# Patient Record
Sex: MALE | Race: WHITE | NOT HISPANIC OR LATINO | Employment: FULL TIME | URBAN - METROPOLITAN AREA
[De-identification: names, ages, dates, MRNs, and addresses within clinical notes are randomized per-mention and may not be internally consistent; named-entity substitution may affect disease eponyms.]

---

## 2018-07-16 ENCOUNTER — TRANSFERRED RECORDS (OUTPATIENT)
Dept: HEALTH INFORMATION MANAGEMENT | Facility: CLINIC | Age: 33
End: 2018-07-16

## 2020-01-07 ENCOUNTER — OFFICE VISIT (OUTPATIENT)
Dept: FAMILY MEDICINE | Facility: CLINIC | Age: 35
End: 2020-01-07
Payer: COMMERCIAL

## 2020-01-07 VITALS
TEMPERATURE: 97.9 F | WEIGHT: 178.1 LBS | SYSTOLIC BLOOD PRESSURE: 105 MMHG | DIASTOLIC BLOOD PRESSURE: 67 MMHG | HEIGHT: 69 IN | HEART RATE: 58 BPM | OXYGEN SATURATION: 95 % | BODY MASS INDEX: 26.38 KG/M2 | RESPIRATION RATE: 18 BRPM

## 2020-01-07 DIAGNOSIS — K29.00 ACUTE GASTRITIS, PRESENCE OF BLEEDING UNSPECIFIED, UNSPECIFIED GASTRITIS TYPE: Primary | ICD-10-CM

## 2020-01-07 PROBLEM — J30.9 ALLERGIC RHINITIS: Status: ACTIVE | Noted: 2020-01-07

## 2020-01-07 PROBLEM — H90.12 CONDUCTIVE HEARING LOSS OF LEFT EAR: Status: ACTIVE | Noted: 2020-01-07

## 2020-01-07 LAB
ERYTHROCYTE [DISTWIDTH] IN BLOOD BY AUTOMATED COUNT: 12.8 % (ref 10–15)
HCT VFR BLD AUTO: 45.7 % (ref 40–53)
HGB BLD-MCNC: 15.7 G/DL (ref 13.3–17.7)
LIPASE SERPL-CCNC: 73 U/L (ref 73–393)
MCH RBC QN AUTO: 31.8 PG (ref 26.5–33)
MCHC RBC AUTO-ENTMCNC: 34.4 G/DL (ref 31.5–36.5)
MCV RBC AUTO: 93 FL (ref 78–100)
PLATELET # BLD AUTO: 225 10E9/L (ref 150–450)
RBC # BLD AUTO: 4.93 10E12/L (ref 4.4–5.9)
WBC # BLD AUTO: 5.5 10E9/L (ref 4–11)

## 2020-01-07 PROCEDURE — 83690 ASSAY OF LIPASE: CPT | Performed by: FAMILY MEDICINE

## 2020-01-07 PROCEDURE — 85027 COMPLETE CBC AUTOMATED: CPT | Performed by: FAMILY MEDICINE

## 2020-01-07 PROCEDURE — 36415 COLL VENOUS BLD VENIPUNCTURE: CPT | Performed by: FAMILY MEDICINE

## 2020-01-07 PROCEDURE — 99203 OFFICE O/P NEW LOW 30 MIN: CPT | Performed by: FAMILY MEDICINE

## 2020-01-07 SDOH — HEALTH STABILITY: MENTAL HEALTH: HOW OFTEN DO YOU HAVE A DRINK CONTAINING ALCOHOL?: MONTHLY OR LESS

## 2020-01-07 ASSESSMENT — MIFFLIN-ST. JEOR: SCORE: 1728.24

## 2020-01-07 NOTE — PROGRESS NOTES
Subjective     Yahir Phillips is a 34 year old male who presents to clinic today for the following health issues:    HPI   Gastrointestinal symptoms      Duration: November 2019    Description:           ABDOMINAL PAIN - middle below the sternum  .   Pain is described as aching and empty ness even on a full stomache .    Intensity:  mild    Accompanying signs and symptoms:  none    History  Previous {similar problem: no   Previous evaluation:  none    Aggravating factors: meals, caffeine and or sometimes it randomly when be achy    Alleviating factors: nothing    Other Therapies tried: none    For 1-2 days around Thanksgiving - acid burning feeling in stomach  Recently started to have again -   Locations is epigastric   Notices 3-4 times a day  Trigger - notices more after eating  Coffee may also be a trigger  No nausea or vomiting  BM - regular and unchanged - soft stools  Unaware of melena or hematochezia    Will use NSAIDs - once or twice a month - alternates advil or aleve  ETOH - drinks social - cut back to about 3 times per month   no family hx of ulcer    Ten years ago had acid reflux - improved with weight loss - this feels different      -------------------------------------    Patient Active Problem List   Diagnosis     Allergic rhinitis     Erythrasma     Family history of colonic polyps     Hyperlipidemia     Conductive hearing loss of left ear     Past Surgical History:   Procedure Laterality Date     FRACTURE TX, WRIST RT/LT       HC TOOTH EXTRACTION W/FORCEP         Social History     Tobacco Use     Smoking status: Never Smoker     Smokeless tobacco: Never Used   Substance Use Topics     Alcohol use: Yes     Frequency: Monthly or less     Family History   Problem Relation Age of Onset     Heart Disease Father      Hypertension Father      Colon Polyps Mother            Reviewed and updated as needed this visit by Provider  Tobacco  Meds  Problems  Med Hx  Surg Hx  Fam Hx  Soc Hx        Review of  "Systems   ROS COMP: Constitutional, HEENT, cardiovascular, pulmonary, GI, , musculoskeletal, neuro, skin, endocrine and psych systems are negative, except as otherwise noted.      Objective    /67   Pulse 58   Temp 97.9  F (36.6  C) (Oral)   Resp 18   Ht 1.753 m (5' 9\")   Wt 80.8 kg (178 lb 1.6 oz)   SpO2 95%   BMI 26.30 kg/m    Body mass index is 26.3 kg/m .  Physical Exam   GENERAL: healthy, alert and no distress  RESP: lungs clear to auscultation - no rales, rhonchi or wheezes  CV: regular rate and rhythm, normal S1 S2, no S3 or S4, no murmur, click or rub, no peripheral edema and peripheral pulses strong  ABDOMEN: bowel sounds normal and soft with some epigastric tenderness but no g/r/r    Diagnostic Test Results:  Labs reviewed in Epic  Results for orders placed or performed in visit on 01/07/20 (from the past 24 hour(s))   CBC with platelets   Result Value Ref Range    WBC 5.5 4.0 - 11.0 10e9/L    RBC Count 4.93 4.4 - 5.9 10e12/L    Hemoglobin 15.7 13.3 - 17.7 g/dL    Hematocrit 45.7 40.0 - 53.0 %    MCV 93 78 - 100 fl    MCH 31.8 26.5 - 33.0 pg    MCHC 34.4 31.5 - 36.5 g/dL    RDW 12.8 10.0 - 15.0 %    Platelet Count 225 150 - 450 10e9/L           Assessment & Plan     1. Acute gastritis, presence of bleeding unspecified, unspecified gastritis type  Suspect gastritis vs gastric ulcer based on his symptoms  Has not tried anything yet so will first try omeprazole 20mg daily for next 2-4 weeks  Discussed checking some labs and will send home with stool to look for H. Pylori  F/u 4-6 weeks  - CBC with platelets  - Lipase  - Helicobacter pylori Antigen Stool; Future  - omeprazole (PRILOSEC) 20 MG DR capsule; Take 1 capsule (20 mg) by mouth daily  Dispense: 30 capsule; Refill: 1           Pt will call or RTC if symptoms worsen or do not improve.      Return in about 4 weeks (around 2/4/2020) for epigastric pain.    Analisa Wilkins,   [facility]    "

## 2020-01-08 NOTE — RESULT ENCOUNTER NOTE
Please send copy of results with a letter:    Enclosed is a copy of your results. If you have any questions, please contact us at [phone].      -Normal red blood cell (hgb) levels, normal white blood cell count and normal platelet levels.  -Lipase or pancreas test is all normal.    Thanks,   Analisa Wilkins, DO

## 2020-01-14 DIAGNOSIS — K29.00 ACUTE GASTRITIS, PRESENCE OF BLEEDING UNSPECIFIED, UNSPECIFIED GASTRITIS TYPE: ICD-10-CM

## 2020-01-14 PROCEDURE — 87338 HPYLORI STOOL AG IA: CPT | Performed by: FAMILY MEDICINE

## 2020-01-15 ENCOUNTER — TELEPHONE (OUTPATIENT)
Dept: FAMILY MEDICINE | Facility: CLINIC | Age: 35
End: 2020-01-15

## 2020-01-15 DIAGNOSIS — K29.00 ACUTE GASTRITIS, PRESENCE OF BLEEDING UNSPECIFIED, UNSPECIFIED GASTRITIS TYPE: ICD-10-CM

## 2020-01-15 DIAGNOSIS — B96.81 GASTRIC ULCER DUE TO HELICOBACTER PYLORI, ACUTE: Primary | ICD-10-CM

## 2020-01-15 DIAGNOSIS — K25.3 GASTRIC ULCER DUE TO HELICOBACTER PYLORI, ACUTE: Primary | ICD-10-CM

## 2020-01-15 LAB — H PYLORI AG STL QL IA: POSITIVE

## 2020-01-15 RX ORDER — CLARITHROMYCIN 500 MG
500 TABLET ORAL 2 TIMES DAILY
Qty: 28 TABLET | Refills: 0 | Status: SHIPPED | OUTPATIENT
Start: 2020-01-15 | End: 2020-01-29

## 2020-01-15 RX ORDER — AMOXICILLIN 500 MG/1
1000 CAPSULE ORAL 2 TIMES DAILY
Qty: 56 CAPSULE | Refills: 0 | Status: SHIPPED | OUTPATIENT
Start: 2020-01-15 | End: 2020-01-29

## 2020-01-15 NOTE — RESULT ENCOUNTER NOTE
Dear Yahir,   Your test results are all back -   I made a slight change to your medications so read below carefully.  I faxed a prescription to your pharmacy for :  Clarithromycin 500mg take one tablet twice a day for 14 days  Amoxicillin 500mg - take TWO TABLETS twice a day for 14 days  Omeprazole 20mg - take one tablet twice a day for 14 days  (stop the omeprazole you were taking because it is in the three meds above)  Let us know if you have any questions.  -Analisa Wilkins, DO

## 2020-01-15 NOTE — TELEPHONE ENCOUNTER
Called patient to discuss -   Will treat with triple therapy  F/u 2-3 months to discuss recheck  PN

## 2020-03-11 ENCOUNTER — HEALTH MAINTENANCE LETTER (OUTPATIENT)
Age: 35
End: 2020-03-11

## 2020-04-07 ENCOUNTER — VIRTUAL VISIT (OUTPATIENT)
Dept: FAMILY MEDICINE | Facility: OTHER | Age: 35
End: 2020-04-07

## 2020-04-08 NOTE — PROGRESS NOTES
"Date: 2020 18:54:24  Clinician: Romana Casillas  Clinician NPI: [id number]  Patient: Yahir Phillips  Patient : 1985  Patient Address: [address] [place] [address]  Patient Phone: [phone]  Visit Protocol: URI  Patient Summary:  Yahir is a 34 year old ( : 1985 ) male who initiated a Visit for COVID-19 (Coronavirus) evaluation and screening. When asked the question \"Please sign me up to receive news, health information and promotions from [employer].\", Yahir responded \"No\".    Yahir states his symptoms started today.   His symptoms consist of myalgia, nasal congestion, malaise, chills, diarrhea, vomiting, and nausea. Yahir also feels feverish.   Symptom details     Nasal secretions: The color of his mucus is clear.    Temperature: His current temperature is 99.2 degrees Fahrenheit.      Yahir denies having rhinitis, facial pain or pressure, sore throat, cough, ear pain, teeth pain, headache, and wheezing. He also denies taking antibiotic medication for the symptoms and having recent facial or sinus surgery in the past 60 days. He is not experiencing dyspnea.   Precipitating events  He has not recently been exposed to someone with influenza. Yahir has not been in close contact with any high risk individuals.   Pertinent COVID-19 (Coronavirus) information  Yahir has not traveled internationally or to the areas where COVID-19 (Coronavirus) is widespread, including cruise ship travel in the last 14 days before the start of his symptoms.   Yahir has not had a close contact with a laboratory-confirmed COVID-19 patient within 14 days of symptom onset. He also has not had a close contact with a suspected COVID-19 patient within 14 days of symptom onset.   Yahir does not work or volunteer as healthcare worker or a  and does not work or volunteer in a healthcare facility. He does not live with a healthcare worker.   Pertinent medical history  Yahir does not need a return " to work/school note.   Weight: 175 lbs   Yahir does not smoke or use smokeless tobacco.   Additional information as reported by the patient (free text): Last night, about 1 hour after eating dinner, I started to feel nauseous.  I started getting cold sweats.  I ended up vomiting and having diarrhea.  I did not have a fever last night.  Throughout the night I had chills, aches and sweats, and vomited and had diarrhea again around 3AM.  This morning work up with   Weight: 175 lbs    MEDICATIONS: No current medications, ALLERGIES: NKDA  Clinician Response:  Dear Yahir,   You are likely having a viral gastroenteritis as manifested by diarrhea, nausea and vomiting. And myalgia  I advise you to hydrate and rest.  Take an electrolyte filled fluid like Gatorade  Follow up at Urgent care or ER if getting worse       Diagnosis: Diarrhea, unspecified  Diagnosis ICD: R19.7

## 2020-07-07 ENCOUNTER — MYC MEDICAL ADVICE (OUTPATIENT)
Dept: FAMILY MEDICINE | Facility: CLINIC | Age: 35
End: 2020-07-07

## 2020-07-07 DIAGNOSIS — H91.92 HEARING LOSS OF LEFT EAR, UNSPECIFIED HEARING LOSS TYPE: ICD-10-CM

## 2020-07-07 DIAGNOSIS — H90.2 CONDUCTIVE HEARING LOSS: Primary | ICD-10-CM

## 2020-07-07 NOTE — TELEPHONE ENCOUNTER
PN  See [employer] message  Last saw you 1/7/20 for acute gastritis.  Started order  Please advise  Thank you,  Tyesha Ashley RN

## 2020-07-09 ENCOUNTER — TELEPHONE (OUTPATIENT)
Dept: SLEEP MEDICINE | Facility: CLINIC | Age: 35
End: 2020-07-09

## 2020-07-09 NOTE — TELEPHONE ENCOUNTER
Patient left message asking is sleep consultation is needed before a sleep study. The sleep center process is that patients have a consultation prior to sleep study. Patient to call if has further questions. This message was relayed to me by Komal Osman.

## 2020-07-14 ENCOUNTER — TELEPHONE (OUTPATIENT)
Dept: AUDIOLOGY | Facility: CLINIC | Age: 35
End: 2020-07-14

## 2020-07-14 NOTE — TELEPHONE ENCOUNTER
Called patient to schedule per Sherri Khan AuD:    1.  Audiology WIN  2. Consult with Dr. Mendes or Dr. Nissen following completion of hearing test:    Appointment Type - VIDEO VISIT NEW / TELEPHONE VISIT NEW  Provider - Dr. Mendes or Dr. Nissen  Appointment Notes - Asymmetric Hearing Loss, WIN prior, previously seen at [facility] w/ scheduling instructions and ENT call center number. Patient is welcome to schedule video or telephone visit with Dr. Nissen or Dr Mendes after completion of  Audiology WIN.

## 2020-09-30 ENCOUNTER — E-VISIT (OUTPATIENT)
Dept: FAMILY MEDICINE | Facility: CLINIC | Age: 35
End: 2020-09-30
Payer: COMMERCIAL

## 2020-09-30 DIAGNOSIS — R10.13 EPIGASTRIC PAIN: ICD-10-CM

## 2020-09-30 DIAGNOSIS — R10.13 ABDOMINAL PAIN, EPIGASTRIC: Primary | ICD-10-CM

## 2020-09-30 PROCEDURE — 99422 OL DIG E/M SVC 11-20 MIN: CPT | Performed by: FAMILY MEDICINE

## 2020-10-02 DIAGNOSIS — R10.13 ABDOMINAL PAIN, EPIGASTRIC: ICD-10-CM

## 2020-10-02 LAB
ALBUMIN SERPL-MCNC: 4.1 G/DL (ref 3.4–5)
ALP SERPL-CCNC: 87 U/L (ref 40–150)
ALT SERPL W P-5'-P-CCNC: 41 U/L (ref 0–70)
ANION GAP SERPL CALCULATED.3IONS-SCNC: 8 MMOL/L (ref 3–14)
AST SERPL W P-5'-P-CCNC: 34 U/L (ref 0–45)
BILIRUB SERPL-MCNC: 0.6 MG/DL (ref 0.2–1.3)
BUN SERPL-MCNC: 11 MG/DL (ref 7–30)
CALCIUM SERPL-MCNC: 9.3 MG/DL (ref 8.5–10.1)
CHLORIDE SERPL-SCNC: 105 MMOL/L (ref 94–109)
CO2 SERPL-SCNC: 24 MMOL/L (ref 20–32)
CREAT SERPL-MCNC: 0.81 MG/DL (ref 0.66–1.25)
ERYTHROCYTE [DISTWIDTH] IN BLOOD BY AUTOMATED COUNT: 12.5 % (ref 10–15)
GFR SERPL CREATININE-BSD FRML MDRD: >90 ML/MIN/{1.73_M2}
GLUCOSE SERPL-MCNC: 90 MG/DL (ref 70–99)
HCT VFR BLD AUTO: 43 % (ref 40–53)
HGB BLD-MCNC: 14.9 G/DL (ref 13.3–17.7)
MCH RBC QN AUTO: 31.7 PG (ref 26.5–33)
MCHC RBC AUTO-ENTMCNC: 34.7 G/DL (ref 31.5–36.5)
MCV RBC AUTO: 92 FL (ref 78–100)
PLATELET # BLD AUTO: 245 10E9/L (ref 150–450)
POTASSIUM SERPL-SCNC: 3.8 MMOL/L (ref 3.4–5.3)
PROT SERPL-MCNC: 7.8 G/DL (ref 6.8–8.8)
RBC # BLD AUTO: 4.7 10E12/L (ref 4.4–5.9)
SODIUM SERPL-SCNC: 137 MMOL/L (ref 133–144)
WBC # BLD AUTO: 4.3 10E9/L (ref 4–11)

## 2020-10-02 PROCEDURE — 85027 COMPLETE CBC AUTOMATED: CPT | Performed by: FAMILY MEDICINE

## 2020-10-02 PROCEDURE — 80053 COMPREHEN METABOLIC PANEL: CPT | Performed by: FAMILY MEDICINE

## 2020-10-02 PROCEDURE — 36415 COLL VENOUS BLD VENIPUNCTURE: CPT | Performed by: FAMILY MEDICINE

## 2020-10-02 RX ORDER — OMEPRAZOLE 40 MG/1
40 CAPSULE, DELAYED RELEASE ORAL 2 TIMES DAILY
Qty: 28 CAPSULE | Refills: 0 | Status: SHIPPED | OUTPATIENT
Start: 2020-10-02 | End: 2020-10-16

## 2020-10-06 ENCOUNTER — DOCUMENTATION ONLY (OUTPATIENT)
Dept: FAMILY MEDICINE | Facility: CLINIC | Age: 35
End: 2020-10-06

## 2020-10-06 DIAGNOSIS — R10.13 ABDOMINAL PAIN, EPIGASTRIC: ICD-10-CM

## 2020-10-06 DIAGNOSIS — R19.5 CHANGE IN STOOL: Primary | ICD-10-CM

## 2020-10-06 LAB — HEMOCCULT STL QL IA: NEGATIVE

## 2020-10-06 PROCEDURE — 82274 ASSAY TEST FOR BLOOD FECAL: CPT | Performed by: FAMILY MEDICINE

## 2020-10-06 PROCEDURE — 87338 HPYLORI STOOL AG IA: CPT | Performed by: FAMILY MEDICINE

## 2020-10-06 NOTE — RESULT ENCOUNTER NOTE
Dear Yahir,   Your test results are all back -   -All of your labs are normal.  Let us know if you have any questions.  -Analisa Wilkins, DO

## 2020-10-07 LAB — H PYLORI AG STL QL IA: NEGATIVE

## 2020-10-08 ENCOUNTER — APPOINTMENT (OUTPATIENT)
Dept: LAB | Facility: CLINIC | Age: 35
End: 2020-10-08
Payer: COMMERCIAL

## 2020-10-09 PROCEDURE — 87209 SMEAR COMPLEX STAIN: CPT | Performed by: FAMILY MEDICINE

## 2020-10-09 PROCEDURE — 87177 OVA AND PARASITES SMEARS: CPT | Performed by: FAMILY MEDICINE

## 2020-10-10 PROCEDURE — 87177 OVA AND PARASITES SMEARS: CPT | Performed by: FAMILY MEDICINE

## 2020-10-10 PROCEDURE — 87209 SMEAR COMPLEX STAIN: CPT | Performed by: FAMILY MEDICINE

## 2020-10-12 DIAGNOSIS — R19.5 CHANGE IN STOOL: ICD-10-CM

## 2020-10-13 LAB
O+P STL MICRO: NORMAL
SPECIMEN SOURCE: NORMAL
SPECIMEN SOURCE: NORMAL

## 2020-10-13 NOTE — RESULT ENCOUNTER NOTE
Dear Yahir,   Your test results are all back -   -All of your labs are normal.  I wonder if we should move on to the endoscopy or if you prefer have you see a GI doctor?  Let us know if you have any questions.  -Analisa Wilkins, DO

## 2021-01-04 ENCOUNTER — HEALTH MAINTENANCE LETTER (OUTPATIENT)
Age: 36
End: 2021-01-04

## 2021-02-18 ENCOUNTER — TRANSFERRED RECORDS (OUTPATIENT)
Dept: HEALTH INFORMATION MANAGEMENT | Facility: CLINIC | Age: 36
End: 2021-02-18

## 2021-02-18 LAB
ALT SERPL-CCNC: 36 IU/L (ref 0–44)
AST SERPL-CCNC: 31 IU/L (ref 0–40)
CREAT SERPL-MCNC: 0.78 MG/DL (ref 0.76–1.27)
GFR SERPL CREATININE-BSD FRML MDRD: 115 ML/MIN/1.73
GLUCOSE SERPL-MCNC: 85 MG/DL (ref 65–99)
POTASSIUM SERPL-SCNC: 4.8 MMOL/L (ref 3.5–5.2)

## 2021-03-10 ENCOUNTER — TRANSFERRED RECORDS (OUTPATIENT)
Dept: HEALTH INFORMATION MANAGEMENT | Facility: CLINIC | Age: 36
End: 2021-03-10

## 2021-03-25 ENCOUNTER — TRANSFERRED RECORDS (OUTPATIENT)
Dept: HEALTH INFORMATION MANAGEMENT | Facility: CLINIC | Age: 36
End: 2021-03-25

## 2021-04-02 NOTE — PROGRESS NOTES
SUBJECTIVE:   CC: Yahir Phillips is an 35 year old male who presents for preventative health visit.       Patient has been advised of split billing requirements and indicates understanding: Yes  Healthy Habits:     Getting at least 3 servings of Calcium per day:  Yes    Bi-annual eye exam:  Yes    Dental care twice a year:  Yes    Sleep apnea or symptoms of sleep apnea:  Daytime drowsiness and Excessive snoring    Diet:  Regular (no restrictions)    Frequency of exercise:  2-3 days/week    Duration of exercise:  30-45 minutes    Taking medications regularly:  Not Applicable    Medication side effects:  Not applicable    PHQ-2 Total Score: 0    Additional concerns today:  Yes        Today's PHQ-2 Score:   PHQ-2 ( 1999 Pfizer) 3/31/2021   Q1: Little interest or pleasure in doing things 0   Q2: Feeling down, depressed or hopeless 0   PHQ-2 Score 0   Q1: Little interest or pleasure in doing things Not at all   Q2: Feeling down, depressed or hopeless Not at all   PHQ-2 Score 0       Abuse: Current or Past(Physical, Sexual or Emotional)- No  Do you feel safe in your environment? Yes    Have you ever done Advance Care Planning? (For example, a Health Directive, POLST, or a discussion with a medical provider or your loved ones about your wishes): No, advance care planning information given to patient to review.  Patient plans to discuss their wishes with loved ones or provider.      Social History     Tobacco Use     Smoking status: Never Smoker     Smokeless tobacco: Never Used   Substance Use Topics     Alcohol use: Yes     Frequency: Monthly or less         No flowsheet data found.    Reviewed and updated as needed this visit by clinical staff  Tobacco  Allergies  Meds              Reviewed and updated as needed this visit by Provider                  New patient today.  Specialists: GI    Social: nonsmoker  Significant FHx: CAD-father, has stent  Exercise/Diet: as per above    Colonoscopy: month ago, also had EGD;  "seeing GI for stomach issues    Tdap: UTD    COVID ab requested. Possible symptoms a year ago.   Interested in few referrals today: audiology for hearing loss, cardiology for family hx of heart disease and optimization, sleep specialist for snoring/sleep apnea evaluation, derm for routine skin check        Review of Systems  10 point ROS of systems including Constitutional, Eyes, Respiratory, Cardiovascular, Gastroenterology, Genitourinary, Integumentary, Muscularskeletal, Psychiatric were all negative except for pertinent positives noted in my HPI.      OBJECTIVE:   /65 (BP Location: Left arm, Patient Position: Sitting)   Pulse 57   Temp 96.9  F (36.1  C) (Temporal)   Ht 1.753 m (5' 9.02\")   Wt 80.7 kg (178 lb)   SpO2 98%   BMI 26.27 kg/m      Physical Exam    GENERAL APPEARANCE: AAOx3, no distress. Well developed.    RESP: Lungs CTA bilaterally. No w/r/r. No distress     CV: RRR, S1/S2 present. No m/r/c.     ABDOMEN:  soft, nontender, no distention. No rebound or guarding.     EXT: No c/c/e in lower extremities b/l. No rashes or deformities noted.    MSK: ROM and strength intact in four extremities. No gross deformities noted.    SKIN: no suspicious lesions or rashes    NEURO: AAOx3, Motor function intact w/ 5/5 strength bilaterally to UE and LE.  Speech is fluent and comprehension intact. No gait abnormalities noted.    PSYCH: appropriate mood and affect.       ASSESSMENT/PLAN:   Yahir was seen today for physical.    Diagnoses and all orders for this visit:    Routine history and physical examination of adult    Family history of colonic polyps   UTD w/ cscope per GI work up recently. Also had EGD.    Encounter for screening for COVID-19  Per pnt request. He understands test limitations and is still planning for vaccine in near future. Asymptomatic   -     COVID-19 Virus (Coronavirus) Antibody & Titer Reflex; Future    Hearing loss, unspecified hearing loss type, unspecified laterality  -     " "AUDIOLOGY ADULT REFERRAL; Future    Family history of ischemic heart disease  Per pnt request, optimization given his family hx which concerns him. Cardio referral and check fasting lipids.  -     CARDIOLOGY EVAL ADULT REFERRAL; Future  -     Lipid panel reflex to direct LDL Fasting    Snoring  -     SLEEP EVALUATION & MANAGEMENT REFERRAL - ADULT -[facility] [place] [phone]  (Age 18 and up); Future    Skin lesion  Routine skin check  -     DERMATOLOGY ADULT REFERRAL; Future    COUNSELING:   Reviewed preventive health counseling, as reflected in patient instructions       Regular exercise       Healthy diet/nutrition    Estimated body mass index is 26.27 kg/m  as calculated from the following:    Height as of this encounter: 1.753 m (5' 9.02\").    Weight as of this encounter: 80.7 kg (178 lb).       He reports that he has never smoked. He has never used smokeless tobacco.      Counseling Resources:  ATP IV Guidelines  Pooled Cohorts Equation Calculator  FRAX Risk Assessment  ICSI Preventive Guidelines  Dietary Guidelines for Americans, 2010  USDA's MyPlate  ASA Prophylaxis  Lung CA Screening    DO ANDREW Self [facility]  "

## 2021-04-04 ENCOUNTER — TRANSFERRED RECORDS (OUTPATIENT)
Dept: HEALTH INFORMATION MANAGEMENT | Facility: CLINIC | Age: 36
End: 2021-04-04

## 2021-04-05 ENCOUNTER — OFFICE VISIT (OUTPATIENT)
Dept: FAMILY MEDICINE | Facility: CLINIC | Age: 36
End: 2021-04-05
Payer: COMMERCIAL

## 2021-04-05 VITALS
BODY MASS INDEX: 26.36 KG/M2 | DIASTOLIC BLOOD PRESSURE: 65 MMHG | WEIGHT: 178 LBS | HEIGHT: 69 IN | SYSTOLIC BLOOD PRESSURE: 101 MMHG | OXYGEN SATURATION: 98 % | HEART RATE: 57 BPM | TEMPERATURE: 96.9 F

## 2021-04-05 DIAGNOSIS — H91.90 HEARING LOSS, UNSPECIFIED HEARING LOSS TYPE, UNSPECIFIED LATERALITY: ICD-10-CM

## 2021-04-05 DIAGNOSIS — Z83.719 FAMILY HISTORY OF COLONIC POLYPS: ICD-10-CM

## 2021-04-05 DIAGNOSIS — Z82.49 FAMILY HISTORY OF ISCHEMIC HEART DISEASE: ICD-10-CM

## 2021-04-05 DIAGNOSIS — Z00.00 ROUTINE HISTORY AND PHYSICAL EXAMINATION OF ADULT: Primary | ICD-10-CM

## 2021-04-05 DIAGNOSIS — L98.9 SKIN LESION: ICD-10-CM

## 2021-04-05 DIAGNOSIS — R06.83 SNORING: ICD-10-CM

## 2021-04-05 DIAGNOSIS — Z11.52 ENCOUNTER FOR SCREENING FOR COVID-19: ICD-10-CM

## 2021-04-05 LAB
CHOLEST SERPL-MCNC: 245 MG/DL
HDLC SERPL-MCNC: 48 MG/DL
LDLC SERPL CALC-MCNC: 167 MG/DL
NONHDLC SERPL-MCNC: 197 MG/DL
TRIGL SERPL-MCNC: 151 MG/DL

## 2021-04-05 PROCEDURE — 99213 OFFICE O/P EST LOW 20 MIN: CPT | Mod: 25 | Performed by: INTERNAL MEDICINE

## 2021-04-05 PROCEDURE — 80061 LIPID PANEL: CPT | Performed by: INTERNAL MEDICINE

## 2021-04-05 PROCEDURE — 86769 SARS-COV-2 COVID-19 ANTIBODY: CPT | Performed by: INTERNAL MEDICINE

## 2021-04-05 PROCEDURE — 36415 COLL VENOUS BLD VENIPUNCTURE: CPT | Performed by: INTERNAL MEDICINE

## 2021-04-05 PROCEDURE — 99395 PREV VISIT EST AGE 18-39: CPT | Performed by: INTERNAL MEDICINE

## 2021-04-05 ASSESSMENT — MIFFLIN-ST. JEOR: SCORE: 1723.03

## 2021-04-06 LAB
SARS-COV-2 AB PNL SERPL IA: NEGATIVE
SARS-COV-2 IGG SERPL IA-ACNC: NORMAL

## 2021-04-07 ENCOUNTER — MYC MEDICAL ADVICE (OUTPATIENT)
Dept: FAMILY MEDICINE | Facility: CLINIC | Age: 36
End: 2021-04-07

## 2021-04-08 ENCOUNTER — TRANSFERRED RECORDS (OUTPATIENT)
Dept: HEALTH INFORMATION MANAGEMENT | Facility: CLINIC | Age: 36
End: 2021-04-08

## 2021-04-09 ENCOUNTER — TRANSFERRED RECORDS (OUTPATIENT)
Dept: HEALTH INFORMATION MANAGEMENT | Facility: CLINIC | Age: 36
End: 2021-04-09

## 2021-04-09 NOTE — TELEPHONE ENCOUNTER
Amy,   See below - does this need to go through you first for insurance referral, or can we just fax over the order?     Thank you  Anya MASON RN

## 2021-04-09 NOTE — TELEPHONE ENCOUNTER
Pt does have open access insurance. You can fax the referral to [facility].    Yuliet-Referral Coordinator

## 2021-04-16 DIAGNOSIS — Z82.49 FAMILY HISTORY OF ISCHEMIC HEART DISEASE: Primary | ICD-10-CM

## 2021-05-03 NOTE — PROGRESS NOTES
[facility] - PRIMARY CARE SKIN    CC: skin cancer screening (full-body)  SUBJECTIVE:   Yahir Phillips is a(n) 35 year old male who presents to clinic today for a full-body skin exam.      Issue One: no particular skin issues      Personal Medical History  Skin cancer: NO  Eczema Psoriasis Lupus   NO NO NO     Family Medical History  Skin cancer: NO  Eczema Psoriasis Lupus   NO NO NO     Sun Protection : SPF 30-50    Occupation: indoor ().  Refer to electronic medical record (EMR) for past medical history and medications.    ROS: 14 point review of systems was negative except the symptoms listed above in the HPI.    OBJECTIVE:   GENERAL: healthy, alert and no distress.  HEENT: PERRL. Conjunctiva, sclera clear.  SKIN: Corey Skin Type - I.  This patient was examined from the top of the head to the bottom of the feet  including scalp, face, neck, trunk, buttocks, both arms, both legs, both hands, both feet, and all fingers and toes. The dermatoscope was used to help evaluate pigmented lesions.  Skin Pertinent Findings:  Face: brown macules    Trunk, arms, legs,  :           Brown, macule(s) most consistent with benign solar lentigo          Raised, coarse textured, stuck appearing lesion consistent with seborrheic keratosis .-back          Brown macules of various sizes and shapes most consistent with (benign) melanocytic nevi       Right lateral chest wall- 4 mm irregular pigmented macule ? Atypical ? other      Left posterior shoulder :    4 mm X 2 mm erythematous smooth lesion ? Scar ? Other       Left abdomen- 4 mm brown macule with irregular pigmentation ? Atypical ? other              ASSESSMENT:     Encounter Diagnoses   Name Primary?     Skin lesion      Seborrheic keratosis Yes     Ephelides      Melanocytic nevi of trunk          PLAN:   Patient Instructions   Schedule for shave removal of three lesions    Skin exam once per year  SUN PROTECTION INSTRUCTIONS  Sun damage can lead to skin cancer and  "premature aging of the skin.      The best way to protect from sun damage to your skin is to avoid the sun during peak hours (10 am - 2 pm) even on overcast days.    Never use tanning beds. Tanning beds are associated with much higher risks of skin cancer.    All tanning damages the skin. Aim for ivory skin year round and you will have less trouble with your skin in years to come. There is no merit in getting \"a base tan\" before a warm weather vacation, as any tanning indicates your body's response to sun damage.    Stop smoking. Smokers have higher rates of skin cancer and also have premature skin wrinkling.    Use UPF sun-protective clothing, which while more expensive initially provides longer lasting coverage without having to worry about remembering to re-apply.  1. Wear a wide-brimmed hat and sunglasses.   2. Wear sun-protective clothing.  [employer] and other [employer] make sun protective clothing that are stylish, comfortable and cool.   [employer] and other [employer] make UV arm sleeves suitable for golfing, gardening and other activities.    Sunscreen instructions:  1. Use sunscreens with Zinc Oxide, Titanium Dioxide or Avobenzone to protect from UVA rays.  2. Use SPF 30-50+ to protect from UVB rays.  3. Re-apply every 2 hours even if water resistant.  4. Apply on your face every day even when cloudy and even in the winter. UVA \"aging rays\" penetrate window glass and are just as strong in the winter as in the summer.    FYI  You should use about 3 tablespoons of sunscreen to protect your whole body. Thus a typical eight ounce bottle of sunscreen should last 4 applications. Remember, that the SPF rating is compromised if you don t apply enough. Most people only apply 1/2 - 1/3 of the amount they need. Also don t forget areas such as your ears, feet, upper back and harder to reach places. Keep in mind that these amounts should be increased for larger body sizes.    Sunscreens with titanium " dioxide and/or zinc oxide in the active ingredients are physical blockers as opposed to chemical blockers. Chemical-free sunscreens should not irritate the skin.    Spray-on sunscreens may be used for touch-up application only, not as a base layer. Also, use with caution around small children due to inhalation risk.    SPF means sun protection factor, which is just the degree to which the sunscreen can protect against UVB rays. There is no rating system for UVA rays. SPF is calculated as the time skin will burn when sunscreen is applied vs. skin without sunscreen.    Water resistant sunscreens should be re-applied every 1-2 hours.    Product Recommendations:    Consider use of sunscreen sticks with Zinc Oxide and Titanium Dioxide active ingredients such as Neutrogena Pure&Free Baby Sunscreen Stick.    Good examples include: Blue Lizard, EltaMD, Solbar    Good daily moisturizers with SPF: Vanicream, CeraVe.    For sensitive skin, consider : Skin[employer]ica Essential Defense Mineral Shield Broad Spectrum SPF 35    Men: consider use of Neutrogena Triple Protect Facial Lotion    Avoid retinyl palmitate products.  Avoid combination products that include both sunscreen and insect repellant, as sunscreen should be applied every 2 hours, but insect repellant should not be applied as frequently.    For more information:  https://www.skincancer.org/prevention/sun-protection/sunscreen/sunscreens-safe-and-effective        The patient was counseled about sunscreens and sun avoidance. The patient was counseled to check the skin regularly and report any lesion that is new, changing, itching, scabbing, bleeding or otherwise bothersome. The patient was discharged ambulatory and in stable condition.    Educational brochures given to patient: skin cancer.

## 2021-05-04 ENCOUNTER — OFFICE VISIT (OUTPATIENT)
Dept: FAMILY MEDICINE | Facility: CLINIC | Age: 36
End: 2021-05-04
Attending: INTERNAL MEDICINE
Payer: COMMERCIAL

## 2021-05-04 ENCOUNTER — OFFICE VISIT (OUTPATIENT)
Dept: CARDIOLOGY | Facility: CLINIC | Age: 36
End: 2021-05-04
Attending: INTERNAL MEDICINE
Payer: COMMERCIAL

## 2021-05-04 VITALS — DIASTOLIC BLOOD PRESSURE: 68 MMHG | SYSTOLIC BLOOD PRESSURE: 108 MMHG

## 2021-05-04 VITALS
SYSTOLIC BLOOD PRESSURE: 107 MMHG | HEART RATE: 54 BPM | BODY MASS INDEX: 26.66 KG/M2 | DIASTOLIC BLOOD PRESSURE: 62 MMHG | WEIGHT: 180 LBS | HEIGHT: 69 IN | OXYGEN SATURATION: 99 %

## 2021-05-04 DIAGNOSIS — Z83.42 FAMILY HISTORY OF HYPERCHOLESTEROLEMIA: ICD-10-CM

## 2021-05-04 DIAGNOSIS — L81.2 EPHELIDES: ICD-10-CM

## 2021-05-04 DIAGNOSIS — E78.00 HYPERCHOLESTEROLEMIA: Primary | ICD-10-CM

## 2021-05-04 DIAGNOSIS — R07.2 PRECORDIAL PAIN: ICD-10-CM

## 2021-05-04 DIAGNOSIS — R94.31 ABNORMAL ELECTROCARDIOGRAM: ICD-10-CM

## 2021-05-04 DIAGNOSIS — L98.9 SKIN LESION: ICD-10-CM

## 2021-05-04 DIAGNOSIS — L82.1 SEBORRHEIC KERATOSIS: Primary | ICD-10-CM

## 2021-05-04 DIAGNOSIS — Z82.49 FAMILY HISTORY OF PREMATURE CORONARY ARTERY DISEASE: ICD-10-CM

## 2021-05-04 DIAGNOSIS — D22.5 MELANOCYTIC NEVI OF TRUNK: ICD-10-CM

## 2021-05-04 PROCEDURE — 99213 OFFICE O/P EST LOW 20 MIN: CPT | Performed by: FAMILY MEDICINE

## 2021-05-04 PROCEDURE — 99204 OFFICE O/P NEW MOD 45 MIN: CPT | Performed by: INTERNAL MEDICINE

## 2021-05-04 PROCEDURE — 93000 ELECTROCARDIOGRAM COMPLETE: CPT | Performed by: INTERNAL MEDICINE

## 2021-05-04 RX ORDER — ATORVASTATIN CALCIUM 40 MG/1
40 TABLET, FILM COATED ORAL DAILY
Qty: 30 TABLET | Refills: 3 | Status: SHIPPED | OUTPATIENT
Start: 2021-05-04 | End: 2021-05-18

## 2021-05-04 RX ORDER — ASCORBIC ACID 1000 MG
TABLET ORAL
COMMUNITY

## 2021-05-04 SDOH — ECONOMIC STABILITY: INCOME INSECURITY: HOW HARD IS IT FOR YOU TO PAY FOR THE VERY BASICS LIKE FOOD, HOUSING, MEDICAL CARE, AND HEATING?: NOT ASKED

## 2021-05-04 SDOH — ECONOMIC STABILITY: FOOD INSECURITY: WITHIN THE PAST 12 MONTHS, THE FOOD YOU BOUGHT JUST DIDN'T LAST AND YOU DIDN'T HAVE MONEY TO GET MORE.: NOT ASKED

## 2021-05-04 SDOH — HEALTH STABILITY: MENTAL HEALTH: HOW MANY STANDARD DRINKS CONTAINING ALCOHOL DO YOU HAVE ON A TYPICAL DAY?: 1 OR 2

## 2021-05-04 SDOH — ECONOMIC STABILITY: TRANSPORTATION INSECURITY
IN THE PAST 12 MONTHS, HAS THE LACK OF TRANSPORTATION KEPT YOU FROM MEDICAL APPOINTMENTS OR FROM GETTING MEDICATIONS?: NOT ASKED

## 2021-05-04 SDOH — HEALTH STABILITY: MENTAL HEALTH: HOW OFTEN DO YOU HAVE 6 OR MORE DRINKS ON ONE OCCASION?: NEVER

## 2021-05-04 SDOH — ECONOMIC STABILITY: FOOD INSECURITY: WITHIN THE PAST 12 MONTHS, YOU WORRIED THAT YOUR FOOD WOULD RUN OUT BEFORE YOU GOT MONEY TO BUY MORE.: NOT ASKED

## 2021-05-04 SDOH — ECONOMIC STABILITY: TRANSPORTATION INSECURITY
IN THE PAST 12 MONTHS, HAS LACK OF TRANSPORTATION KEPT YOU FROM MEETINGS, WORK, OR FROM GETTING THINGS NEEDED FOR DAILY LIVING?: NOT ASKED

## 2021-05-04 SDOH — HEALTH STABILITY: MENTAL HEALTH: HOW OFTEN DO YOU HAVE A DRINK CONTAINING ALCOHOL?: 2-4 TIMES A MONTH

## 2021-05-04 ASSESSMENT — MIFFLIN-ST. JEOR: SCORE: 1731.85

## 2021-05-04 NOTE — PROGRESS NOTES
Service Date: 05/04/2021    PRIMARY CARE AND REFERRING PROVIDER:  Dr. Dominga Villalobos     REASON FOR VISIT:  Cardiovascular risk stratification in the context of hypercholesterolemia and family history of premature CAD.    HISTORY OF PRESENT ILLNESS:    It was my pleasure to visit with Roberto Phillips who is a pleasant 35-year-old non-obese  gentleman, , employed as an , never-tobacco user.  BMI 26 kg/m2.    Roberto is here because of his concerning family history of premature coronary artery disease.  Both his parents are alive.  His father has a history of hypertension and coronary artery disease with coronary stents placed in his early-to-mid 50s (never smoker).  Mother has hypercholesterolemia and is on treatment.  His paternal grandmother had CABG in her 60s (nonsmoker).  Paternal uncle had myocardial infarctions in his 50s (nonsmoker).  Roberto has 2 siblings and does not know if they have hypercholesterolemia.    Roberto has had elevated cholesterol for as far as he can remember.  His most recent total cholesterol was 245, HDL 48, LDL is high at 167, triglycerides are 151.    I also [facility]  In 2011, his LDL was 143.  In 2018, it was 159.  His HDL has always been lowish at 42 and 48.  He has not been on any lipid-lowering therapy.  He tries to watch his diet.  His weight has not changed appreciably.  He is a meat eater but tries to minimize red meat.  Drinks alcohol socially (2-4 times a month, no binge drinking).  No special diet such as the ketogenic diet.  No tobacco use.  Plays hockey for an hour 3 times a week.    He also describes chest pain that is retrosternal and is most noticeable in bed at night.  Does not radiate, mild in intensity.    DIAGNOSTIC DATA:    ECG done today.  Personally reviewed image.  It is abnormal.  He has sinus bradycardia at 57 BPM which is likely physiologic.  However, he has abnormal T-wave  inversions in inferolateral leads (leads III, aVF, V5-V6).  I compared this to the report of an ECG in Care Everywhere done in 2020 and it stayed sinus bradycardia and abnormal.    Laboratories:  Lipid panel as above.  Creatinine 0.78.  Normal liver enzymes.  Normal CBC.    PHYSICAL EXAMINATION:  Details attached to this note; unremarkable.  VITAL SIGNS:  /62, pulse 54 per minute.  Height 5 feet 9 inches, weight 180 pounds, BMI 26 kg/m2.  HEENT:  Exam is negative for xanthelasma, tendon xanthomas or arcus lipiodes.    VASCULAR EXAMINATION:  Normal.    CARDIAC EXAMINATION:  Normal.    DIAGNOSES:    1.  Precordial chest pain.  2.  Hypercholesterolemia.  3.  Family history of premature CAD.  4.  Family history of hypercholesterolemia.  5.  Abnormal electrocardiogram.    ASSESSMENT:    With his concerning family history and high LDL, Roberto's cardiovascular risk is certainly high but I congratulated him on coming in today because we can take important steps at preventing future heart disease.    PLAN:    1.  Additional fasting lipid laboratories; lipoprotein(a), serum homocysteine, NMR lipid particles, direct LDL, hs-CRP.  2.  After he has completed his fasting lipid labs, he will start atorvastatin 40 mg daily.  If any side effects, he will contact my nurses.    3.  He is understandably concerned with his chest pain and family history.  We should proceed to a CT coronary angiogram.  No shellfish or iodine allergy.  4.  Genetics referral for familial hypercholesterolemia.  5.  Echocardiogram due to abnormal EKG.  6.  Followup video visit in 1 month to discuss results and see how he is doing with statin initiation.    TOTAL TIME:  50 minutes.    Alexandr Mar MD        D: 2021   T: 2021   MT: anisha    Name:     JENY SCHULTZ  MRN:      [id number]        Account:      [id number]   :      1985           Service Date: 2021       Document: [id number]

## 2021-05-04 NOTE — PATIENT INSTRUCTIONS
TESTIN.  Additional fasting lipid labs - lipoprotein a, serum homocysteine, lipid NMR particles, direct LDL, hs CRP.   2.  CT coronary angiogram.   3.  Genetics referral to discuss familial hypercholesterolemia.  4. Heart ultrasound.    MEDICATIONS:   1.  After completing fasting lipid labs, please start  atorvastatin 40 mg daily.    FOLLOW-UP:   1.  Follow-up with Dr. Mar in 1 months - video visit.    If you have any questions or concerns, please contact my nurses at [phone].

## 2021-05-04 NOTE — PROGRESS NOTES
Clinic visit note dictated. Dictation reference number - [id number]          REVIEW OF SYSTEMS:  A comprehensive 10-point review of systems was completed and the pertinent positives are documented in the history of present illness.    Skin:  Negative     Eyes:  Negative    ENT:  Negative    Respiratory:  Negative    Cardiovascular:    Positive for;chest pain  Gastroenterology: Negative    Genitourinary:  Negative    Musculoskeletal:  Negative    Neurologic:  Negative    Psychiatric:  Negative    Heme/Lymph/Imm:  Negative    Endocrine:  Negative      CURRENT MEDICATIONS:  Current Outpatient Medications   Medication Sig Dispense Refill     atorvastatin (LIPITOR) 40 MG tablet Take 1 tablet (40 mg) by mouth daily 30 tablet 3     Valerian Root 500 MG CAPS            ALLERGIES:  No Known Allergies    PAST MEDICAL HISTORY:    Past Medical History:   Diagnosis Date     Pure hypercholesterolemia        PAST SURGICAL HISTORY:    Past Surgical History:   Procedure Laterality Date     FRACTURE TX, WRIST RT/LT       HC TOOTH EXTRACTION W/FORCEP         FAMILY HISTORY:    Family History   Problem Relation Age of Onset     Hypertension Father      Coronary Artery Disease Father 58        Never smoker.     Colon Polyps Mother      Hyperlipidemia Mother      CABG Paternal Grandmother 64     Myocardial Infarction Paternal Uncle 56       SOCIAL HISTORY:    Social History     Socioeconomic History     Marital status:      Spouse name: None     Number of children: 0     Years of education: None     Highest education level: None   Occupational History     Occupation: .   Social Needs     Financial resource strain: None     Food insecurity     Worry: None     Inability: None     Transportation needs     Medical: None     Non-medical: None   Tobacco Use     Smoking status: Never Smoker     Smokeless tobacco: Never Used   Substance and Sexual Activity     Alcohol use: Yes     Frequency: 2-4 times a month     Drinks  "per session: 1 or 2     Binge frequency: Never     Comment: 1 per week     Drug use: Never     Sexual activity: None   Lifestyle     Physical activity     Days per week: None     Minutes per session: None     Stress: None   Relationships     Social connections     Talks on phone: None     Gets together: None     Attends [employer] service: None     Active member of club or organization: None     Attends meetings of clubs or organizations: None     Relationship status: None     Intimate partner violence     Fear of current or ex partner: None     Emotionally abused: None     Physically abused: None     Forced sexual activity: None   Other Topics Concern     Parent/sibling w/ CABG, MI or angioplasty before 65F 55M? Not Asked   Social History Narrative     None       PHYSICAL EXAM:    Vitals: /62   Pulse 54   Ht 1.753 m (5' 9\")   Wt 81.6 kg (180 lb)   SpO2 99%   BMI 26.58 kg/m    Wt Readings from Last 5 Encounters:   05/04/21 81.6 kg (180 lb)   04/05/21 80.7 kg (178 lb)   01/07/20 80.8 kg (178 lb 1.6 oz)       Constitutional: Comfortable at rest. Cooperative, alert and oriented, well developed, well nourished.  Eyes: Pupils equal and round, conjunctivae and lids unremarkable, sclera white, no xanthalasma,   ENT: Satisfactory dentition.  No cyanosis or pallor.  Neck: Carotid pulses are full and equal bilaterally, no carotid bruit, no thyromegaly    Respiratory: Normal respiratory effort with symmetrical chest wall movements and no use of accessory muscles. Good air entry with normal breath sounds and no rales or wheeze.  Cardiovascular: Normal jugular venous pulse and pressure.  Normal carotid pulse character and volume.  No carotid bruit.  Apical impulse is undisplaced and normal to palpation without parasternal heave or thrill.  Heart sounds are normal and regular. No audible murmur. No S3, S4 or friction rub.    GI: Soft, nontender, no hepatosplenomegaly, no masses, active bowel sounds.    Skin: No rash, " erythema, ecchymosis.  Musculoskeletal: Normal muscle tone and strength. Normal gait. No spinal deformities.  Neuropsychiatric: Oriented to time place and person.  Affect normal.  No gross motor deficits.  Extremities: No edema. No clubbing or deformities.        Encounter Diagnoses   Name Primary?     Hypercholesterolemia Yes     Family history of premature coronary artery disease      Family history of hypercholesterolemia      Precordial pain      Abnormal electrocardiogram        Orders Placed This Encounter   Procedures     CTA Angiogram coronary artery     LDL cholesterol direct     LipoFit by NMR     Lipoprotein (a)     Homocysteine     CRP cardiac risk     Follow-Up with Cardiologist     GENETICS REFERRAL     EKG 12-lead complete w/read - Clinics (performed today)     Echocardiogram Complete

## 2021-05-04 NOTE — LETTER
5/4/2021    [facility]  [address]  [facility] [address]    RE: Yahir Phillips       Dear Colleague,    I had the pleasure of seeing Yahir Phillips in the [facility].    Clinic visit note dictated. Dictation reference number - [id number]          REVIEW OF SYSTEMS:  A comprehensive 10-point review of systems was completed and the pertinent positives are documented in the history of present illness.    Skin:  Negative     Eyes:  Negative    ENT:  Negative    Respiratory:  Negative    Cardiovascular:    Positive for;chest pain  Gastroenterology: Negative    Genitourinary:  Negative    Musculoskeletal:  Negative    Neurologic:  Negative    Psychiatric:  Negative    Heme/Lymph/Imm:  Negative    Endocrine:  Negative      CURRENT MEDICATIONS:  Current Outpatient Medications   Medication Sig Dispense Refill     atorvastatin (LIPITOR) 40 MG tablet Take 1 tablet (40 mg) by mouth daily 30 tablet 3     Valerian Root 500 MG CAPS            ALLERGIES:  No Known Allergies    PAST MEDICAL HISTORY:    Past Medical History:   Diagnosis Date     Pure hypercholesterolemia        PAST SURGICAL HISTORY:    Past Surgical History:   Procedure Laterality Date     FRACTURE TX, WRIST RT/LT       HC TOOTH EXTRACTION W/FORCEP         FAMILY HISTORY:    Family History   Problem Relation Age of Onset     Hypertension Father      Coronary Artery Disease Father 58        Never smoker.     Colon Polyps Mother      Hyperlipidemia Mother      CABG Paternal Grandmother 64     Myocardial Infarction Paternal Uncle 56       SOCIAL HISTORY:    Social History     Socioeconomic History     Marital status:      Spouse name: None     Number of children: 0     Years of education: None     Highest education level: None   Occupational History     Occupation: .   Social Needs     Financial resource strain: None     Food insecurity     Worry: None      "Inability: None     Transportation needs     Medical: None     Non-medical: None   Tobacco Use     Smoking status: Never Smoker     Smokeless tobacco: Never Used   Substance and Sexual Activity     Alcohol use: Yes     Frequency: 2-4 times a month     Drinks per session: 1 or 2     Binge frequency: Never     Comment: 1 per week     Drug use: Never     Sexual activity: None   Lifestyle     Physical activity     Days per week: None     Minutes per session: None     Stress: None   Relationships     Social connections     Talks on phone: None     Gets together: None     Attends Yarsani service: None     Active member of club or organization: None     Attends meetings of clubs or organizations: None     Relationship status: None     Intimate partner violence     Fear of current or ex partner: None     Emotionally abused: None     Physically abused: None     Forced sexual activity: None   Other Topics Concern     Parent/sibling w/ CABG, MI or angioplasty before 65F 55M? Not Asked   Social History Narrative     None       PHYSICAL EXAM:    Vitals: /62   Pulse 54   Ht 1.753 m (5' 9\")   Wt 81.6 kg (180 lb)   SpO2 99%   BMI 26.58 kg/m    Wt Readings from Last 5 Encounters:   05/04/21 81.6 kg (180 lb)   04/05/21 80.7 kg (178 lb)   01/07/20 80.8 kg (178 lb 1.6 oz)       Constitutional: Comfortable at rest. Cooperative, alert and oriented, well developed, well nourished.  Eyes: Pupils equal and round, conjunctivae and lids unremarkable, sclera white, no xanthalasma,   ENT: Satisfactory dentition.  No cyanosis or pallor.  Neck: Carotid pulses are full and equal bilaterally, no carotid bruit, no thyromegaly    Respiratory: Normal respiratory effort with symmetrical chest wall movements and no use of accessory muscles. Good air entry with normal breath sounds and no rales or wheeze.  Cardiovascular: Normal jugular venous pulse and pressure.  Normal carotid pulse character and volume.  No carotid bruit.  Apical impulse " is undisplaced and normal to palpation without parasternal heave or thrill.  Heart sounds are normal and regular. No audible murmur. No S3, S4 or friction rub.    GI: Soft, nontender, no hepatosplenomegaly, no masses, active bowel sounds.    Skin: No rash, erythema, ecchymosis.  Musculoskeletal: Normal muscle tone and strength. Normal gait. No spinal deformities.  Neuropsychiatric: Oriented to time place and person.  Affect normal.  No gross motor deficits.  Extremities: No edema. No clubbing or deformities.        Encounter Diagnoses   Name Primary?     Hypercholesterolemia Yes     Family history of premature coronary artery disease      Family history of hypercholesterolemia      Precordial pain      Abnormal electrocardiogram        Orders Placed This Encounter   Procedures     CTA Angiogram coronary artery     LDL cholesterol direct     LipoFit by NMR     Lipoprotein (a)     Homocysteine     CRP cardiac risk     Follow-Up with Cardiologist     GENETICS REFERRAL     EKG 12-lead complete w/read - Clinics (performed today)     Echocardiogram Complete     Thank you for allowing me to participate in the care of your patient.      Sincerely,     Alexandr Mar MD     [facility]    cc:   Dominga Villalobos DO  [address]  [place] [address]

## 2021-05-04 NOTE — PATIENT INSTRUCTIONS
"Schedule for shave removal of three lesions    Skin exam once per year  SUN PROTECTION INSTRUCTIONS  Sun damage can lead to skin cancer and premature aging of the skin.      The best way to protect from sun damage to your skin is to avoid the sun during peak hours (10 am - 2 pm) even on overcast days.    Never use tanning beds. Tanning beds are associated with much higher risks of skin cancer.    All tanning damages the skin. Aim for ivory skin year round and you will have less trouble with your skin in years to come. There is no merit in getting \"a base tan\" before a warm weather vacation, as any tanning indicates your body's response to sun damage.    Stop smoking. Smokers have higher rates of skin cancer and also have premature skin wrinkling.    Use UPF sun-protective clothing, which while more expensive initially provides longer lasting coverage without having to worry about remembering to re-apply.  1. Wear a wide-brimmed hat and sunglasses.   2. Wear sun-protective clothing.  [employer] and other [employer] make sun protective clothing that are stylish, comfortable and cool.   [employer] and other [employer] make UV arm sleeves suitable for golfing, gardening and other activities.    Sunscreen instructions:  1. Use sunscreens with Zinc Oxide, Titanium Dioxide or Avobenzone to protect from UVA rays.  2. Use SPF 30-50+ to protect from UVB rays.  3. Re-apply every 2 hours even if water resistant.  4. Apply on your face every day even when cloudy and even in the winter. UVA \"aging rays\" penetrate window glass and are just as strong in the winter as in the summer.    FYI  You should use about 3 tablespoons of sunscreen to protect your whole body. Thus a typical eight ounce bottle of sunscreen should last 4 applications. Remember, that the SPF rating is compromised if you don t apply enough. Most people only apply 1/2 - 1/3 of the amount they need. Also don t forget areas such as your ears, feet, upper " back and harder to reach places. Keep in mind that these amounts should be increased for larger body sizes.    Sunscreens with titanium dioxide and/or zinc oxide in the active ingredients are physical blockers as opposed to chemical blockers. Chemical-free sunscreens should not irritate the skin.    Spray-on sunscreens may be used for touch-up application only, not as a base layer. Also, use with caution around small children due to inhalation risk.    SPF means sun protection factor, which is just the degree to which the sunscreen can protect against UVB rays. There is no rating system for UVA rays. SPF is calculated as the time skin will burn when sunscreen is applied vs. skin without sunscreen.    Water resistant sunscreens should be re-applied every 1-2 hours.    Product Recommendations:    Consider use of sunscreen sticks with Zinc Oxide and Titanium Dioxide active ingredients such as Neutrogena Pure&Free Baby Sunscreen Stick.    Good examples include: Blue Lizard, EltaMD, Solbar    Good daily moisturizers with SPF: Vanicream, CeraVe.    For sensitive skin, consider : SkinMedica Essential Defense Mineral Shield Broad Spectrum SPF 35    Men: consider use of Neutrogena Triple Protect Facial Lotion    Avoid retinyl palmitate products.  Avoid combination products that include both sunscreen and insect repellant, as sunscreen should be applied every 2 hours, but insect repellant should not be applied as frequently.    For more information:  https://www.skincancer.org/prevention/sun-protection/sunscreen/sunscreens-safe-and-effective

## 2021-05-04 NOTE — LETTER
5/4/2021         RE: Yahir Phillips  [address]  [place] [address]        Dear Colleague,    Thank you for referring your patient, Yahir Phillips, to the [facility]. Please see a copy of my visit note below.    [facility] - PRIMARY CARE SKIN    CC: skin cancer screening (full-body)  SUBJECTIVE:   Yahir Phillips is a(n) 35 year old male who presents to clinic today for a full-body skin exam.      Issue One: no particular skin issues      Personal Medical History  Skin cancer: NO  Eczema Psoriasis Lupus   NO NO NO     Family Medical History  Skin cancer: NO  Eczema Psoriasis Lupus   NO NO NO     Sun Protection : SPF 30-50    Occupation: indoor ().  Refer to electronic medical record (EMR) for past medical history and medications.    ROS: 14 point review of systems was negative except the symptoms listed above in the HPI.    OBJECTIVE:   GENERAL: healthy, alert and no distress.  HEENT: PERRL. Conjunctiva, sclera clear.  SKIN: Corey Skin Type - I.  This patient was examined from the top of the head to the bottom of the feet  including scalp, face, neck, trunk, buttocks, both arms, both legs, both hands, both feet, and all fingers and toes. The dermatoscope was used to help evaluate pigmented lesions.  Skin Pertinent Findings:  Face: brown macules    Trunk, arms, legs,  :           Brown, macule(s) most consistent with benign solar lentigo          Raised, coarse textured, stuck appearing lesion consistent with seborrheic keratosis .-back          Brown macules of various sizes and shapes most consistent with (benign) melanocytic nevi       Right lateral chest wall- 4 mm irregular pigmented macule ? Atypical ? other      Left posterior shoulder :    4 mm X 2 mm erythematous smooth lesion ? Scar ? Other       Left abdomen- 4 mm brown macule with irregular pigmentation ? Atypical ? other              ASSESSMENT:     Encounter Diagnoses   Name Primary?     Skin  "lesion      Seborrheic keratosis Yes     Ephelides      Melanocytic nevi of trunk          PLAN:   Patient Instructions   Schedule for shave removal of three lesions    Skin exam once per year  SUN PROTECTION INSTRUCTIONS  Sun damage can lead to skin cancer and premature aging of the skin.      The best way to protect from sun damage to your skin is to avoid the sun during peak hours (10 am - 2 pm) even on overcast days.    Never use tanning beds. Tanning beds are associated with much higher risks of skin cancer.    All tanning damages the skin. Aim for ivory skin year round and you will have less trouble with your skin in years to come. There is no merit in getting \"a base tan\" before a warm weather vacation, as any tanning indicates your body's response to sun damage.    Stop smoking. Smokers have higher rates of skin cancer and also have premature skin wrinkling.    Use UPF sun-protective clothing, which while more expensive initially provides longer lasting coverage without having to worry about remembering to re-apply.  1. Wear a wide-brimmed hat and sunglasses.   2. Wear sun-protective clothing.  [employer] and other [employer] make sun protective clothing that are stylish, comfortable and cool.   [employer] and other [employer] make UV arm sleeves suitable for golfing, gardening and other activities.    Sunscreen instructions:  1. Use sunscreens with Zinc Oxide, Titanium Dioxide or Avobenzone to protect from UVA rays.  2. Use SPF 30-50+ to protect from UVB rays.  3. Re-apply every 2 hours even if water resistant.  4. Apply on your face every day even when cloudy and even in the winter. UVA \"aging rays\" penetrate window glass and are just as strong in the winter as in the summer.    FYI  You should use about 3 tablespoons of sunscreen to protect your whole body. Thus a typical eight ounce bottle of sunscreen should last 4 applications. Remember, that the SPF rating is compromised if you don t apply " enough. Most people only apply 1/2 - 1/3 of the amount they need. Also don t forget areas such as your ears, feet, upper back and harder to reach places. Keep in mind that these amounts should be increased for larger body sizes.    Sunscreens with titanium dioxide and/or zinc oxide in the active ingredients are physical blockers as opposed to chemical blockers. Chemical-free sunscreens should not irritate the skin.    Spray-on sunscreens may be used for touch-up application only, not as a base layer. Also, use with caution around small children due to inhalation risk.    SPF means sun protection factor, which is just the degree to which the sunscreen can protect against UVB rays. There is no rating system for UVA rays. SPF is calculated as the time skin will burn when sunscreen is applied vs. skin without sunscreen.    Water resistant sunscreens should be re-applied every 1-2 hours.    Product Recommendations:    Consider use of sunscreen sticks with Zinc Oxide and Titanium Dioxide active ingredients such as Neutrogena Pure&Free Baby Sunscreen Stick.    Good examples include: Blue Lizard, EltaMD, Solbar    Good daily moisturizers with SPF: Vanicream, CeraVe.    For sensitive skin, consider : SkinMedica Essential Defense Mineral Shield Broad Spectrum SPF 35    Men: consider use of Neutrogena Triple Protect Facial Lotion    Avoid retinyl palmitate products.  Avoid combination products that include both sunscreen and insect repellant, as sunscreen should be applied every 2 hours, but insect repellant should not be applied as frequently.    For more information:  https://www.skincancer.org/prevention/sun-protection/sunscreen/sunscreens-safe-and-effective        The patient was counseled about sunscreens and sun avoidance. The patient was counseled to check the skin regularly and report any lesion that is new, changing, itching, scabbing, bleeding or otherwise bothersome. The patient was discharged ambulatory and in  stable condition.    Educational brochures given to patient: skin cancer.            Again, thank you for allowing me to participate in the care of your patient.        Sincerely,        Angie Small MD

## 2021-05-05 ENCOUNTER — HOSPITAL ENCOUNTER (OUTPATIENT)
Dept: LAB | Facility: CLINIC | Age: 36
Discharge: HOME | End: 2021-05-05
Attending: INTERNAL MEDICINE | Admitting: INTERNAL MEDICINE
Payer: COMMERCIAL

## 2021-05-05 DIAGNOSIS — Z83.42 FAMILY HISTORY OF HYPERCHOLESTEROLEMIA: ICD-10-CM

## 2021-05-05 DIAGNOSIS — R94.31 ABNORMAL ELECTROCARDIOGRAM: ICD-10-CM

## 2021-05-05 DIAGNOSIS — E78.00 HYPERCHOLESTEROLEMIA: ICD-10-CM

## 2021-05-05 DIAGNOSIS — Z82.49 FAMILY HISTORY OF PREMATURE CORONARY ARTERY DISEASE: ICD-10-CM

## 2021-05-05 DIAGNOSIS — R07.2 PRECORDIAL PAIN: ICD-10-CM

## 2021-05-05 LAB
CRP SERPL HS-MCNC: 8.3 MG/L
HCYS SERPL-SCNC: 8.5 UMOL/L (ref 4–12)
LDLC SERPL DIRECT ASSAY-MCNC: 147 MG/DL

## 2021-05-05 PROCEDURE — 83695 ASSAY OF LIPOPROTEIN(A): CPT | Performed by: INTERNAL MEDICINE

## 2021-05-05 PROCEDURE — 83090 ASSAY OF HOMOCYSTEINE: CPT | Performed by: INTERNAL MEDICINE

## 2021-05-05 PROCEDURE — 86141 C-REACTIVE PROTEIN HS: CPT | Performed by: INTERNAL MEDICINE

## 2021-05-05 PROCEDURE — 80061 LIPID PANEL: CPT | Performed by: INTERNAL MEDICINE

## 2021-05-05 PROCEDURE — 83704 LIPOPROTEIN BLD QUAN PART: CPT | Performed by: INTERNAL MEDICINE

## 2021-05-05 PROCEDURE — 36415 COLL VENOUS BLD VENIPUNCTURE: CPT | Performed by: INTERNAL MEDICINE

## 2021-05-05 PROCEDURE — 83721 ASSAY OF BLOOD LIPOPROTEIN: CPT | Performed by: INTERNAL MEDICINE

## 2021-05-06 ENCOUNTER — TELEPHONE (OUTPATIENT)
Dept: CARDIOLOGY | Facility: CLINIC | Age: 36
End: 2021-05-06

## 2021-05-06 LAB — LPA SERPL-MCNC: 6 MG/DL

## 2021-05-06 NOTE — TELEPHONE ENCOUNTER
----- Message from Keren England RN sent at 5/6/2021 10:59 AM CDT -----  Regarding: referral  Hi all,    We received a call from Roberto regarding a referral for this patient. When I look at Dr. Mar's note, it is a referral to genetics for familial hypercholesteremia, which is NOT us. Dr. Mar either wants patient to go to genetics for testing (not cv genetics) or to refer to our Lipid clinic at the  (Dr. Benjamin Emmanuel or Dr. Roman Salas). I'll let you all follow up on next steps.    Thanks,  Keren PHAM [facility]      Will message Dr. Mar regarding referral.

## 2021-05-07 ENCOUNTER — TELEPHONE (OUTPATIENT)
Dept: CARDIOLOGY | Facility: CLINIC | Age: 36
End: 2021-05-07

## 2021-05-07 LAB
CHOLEST SERPL-MCNC: 226 MG/DL
HDL SERPL QN: 8.5 NM
HDL SERPL-SCNC: 31.9 UMOL/L
HDLC SERPL-MCNC: 44 MG/DL (ref 40–59)
HLD.LARGE SERPL-SCNC: <2.8 UMOL/L
LDL SERPL QN: 20.7 NM
LDL SERPL-SCNC: 1742 NMOL/L
LDL SMALL SERPL-SCNC: 911 NMOL/L
LDLC SERPL CALC-MCNC: 157 MG/DL
PATHOLOGY STUDY: ABNORMAL
TRIGL SERPL-MCNC: 127 MG/DL (ref 30–149)
VLDL LARGE SERPL-SCNC: 3.4 NMOL/L
VLDL SERPL QN: 47.2 NM

## 2021-05-07 NOTE — TELEPHONE ENCOUNTER
Additional fasting lipid laboratories; lipoprotein(a), serum homocysteine, NMR lipid particles, direct LDL, hs-CRP ordered 5/4/2021 for cardiovascular risk work up.  CTa and echo are scheduled on 5/28/2021.    Component      Latest Ref Rng & Units 5/5/2021   CRP Cardiac Risk      mg/L 8.3   Homocysteine umol/L      4.0 - 12.0 umol/L 8.5   Lipoprotein (a)      <=29 mg/dL 6   LDL Cholesterol Direct      <100 mg/dL 147 (H)     Please see note from yesterday about referrals - clarification needed.    Will message Dr. Mar to review

## 2021-05-07 NOTE — TELEPHONE ENCOUNTER
Regarding: referral  Hi all,     We received a call from [facility] regarding a referral for this patient. When I look at Dr. Mar's note, it is a referral to genetics for familial hypercholesteremia, which is NOT us. Dr. Mar either wants patient to go to genetics for testing (not cv genetics) or to refer to our Lipid clinic at the  (Dr. Benjamin Emmanuel or Dr. Roman Salas). I'll let you all follow up on next steps.     Thanks,  Keren PHAM [facility]

## 2021-05-10 ENCOUNTER — MYC MEDICAL ADVICE (OUTPATIENT)
Dept: CARDIOLOGY | Facility: CLINIC | Age: 36
End: 2021-05-10

## 2021-05-10 ENCOUNTER — TELEPHONE (OUTPATIENT)
Dept: CARDIOLOGY | Facility: CLINIC | Age: 36
End: 2021-05-10

## 2021-05-10 NOTE — TELEPHONE ENCOUNTER
Per Dr Mar, patient should see Keren Low for genetics referral. Order is in chart. Called patient with phone number for scheduling, left message with this information ( [phone]) and to call back if he had any further questions.

## 2021-05-11 NOTE — TELEPHONE ENCOUNTER
My chart message :  Clint Mar,   On Thursday May 20, I have a CTA ANGIOGRAM CORONARY ARTERY appointment at 11am and a Echo Complete appointment at 12:45pm.  Later that day at 3pm, I have my second Pfizer Covid vaccine shot.  Is there any reason (because of the dye that is injected into me or anything else from the two heart appointments) I should not have my second vaccine that same day?  I can easily reschedule the vaccine to a later date if needed.  Let me know.     Thanks   Roberto     Reply to patient:    Good morning, Mr. Phillips,    We have no protocols for post-testing to delay your 2nd vaccine plan. Thank you for checking.    Team 2 RNs  [phone]

## 2021-05-17 ENCOUNTER — MYC MEDICAL ADVICE (OUTPATIENT)
Dept: CARDIOLOGY | Facility: CLINIC | Age: 36
End: 2021-05-17

## 2021-05-17 DIAGNOSIS — Z82.49 FAMILY HISTORY OF PREMATURE CORONARY ARTERY DISEASE: ICD-10-CM

## 2021-05-17 DIAGNOSIS — M79.10 MUSCLE ACHE: Primary | ICD-10-CM

## 2021-05-17 DIAGNOSIS — E78.00 HYPERCHOLESTEROLEMIA: ICD-10-CM

## 2021-05-17 NOTE — TELEPHONE ENCOUNTER
My chart message from patient:  Hi Dr Mar,   In the last couple of weeks, I've started noticing that I am easily straining/pulling my leg muscles while playing hockey.  I play Mon/Wed/Fri, but these pulls are now making me miss playing.  This has never been the case before, so I am wondering if the statin I just started a few weeks ago is causing this.  Also, my urine is quite a bit more yellow now that I've started on the statin.  Any thoughts?     Thanks   Roberto     Per dictation from 5/4/2021:   After he has completed his fasting lipid labs, he will start atorvastatin 40 mg daily.  If any side effects, he will contact my nurses.      Will message Dr. Mar to review      1703 reply from Dr. Mar: please call patient tonight:  Please stop statin immediately.  Drink plenty of water.  Come in tomorrow to get a comprehensive metabolic panel, serum aldolase, creatinine kinase.    Dr. Mar p    Called patient to discuss Dr. Mar's recommendation  1) patient will stop statin  2) will drink water to dilute urine  3) will avoid muscle strain and athletic events until cleared  4) scheduled for labs tomorrow at 1:45.    Patient asked if he can have his labs at the clinic or if he needs to report directly to the hospital lab.  Will message lab team and call patient with update    Per Dr. Mar - alert her when the results are back    0824 called patient with update that all 3 labs can be drawn at the cardiology lab today

## 2021-05-18 DIAGNOSIS — M79.10 MUSCLE ACHE: ICD-10-CM

## 2021-05-18 LAB
ALBUMIN SERPL-MCNC: 4.3 G/DL (ref 3.4–5)
ALP SERPL-CCNC: 94 U/L (ref 40–150)
ALT SERPL W P-5'-P-CCNC: 51 U/L (ref 0–70)
ANION GAP SERPL CALCULATED.3IONS-SCNC: 4 MMOL/L (ref 3–14)
AST SERPL W P-5'-P-CCNC: 38 U/L (ref 0–45)
BILIRUB SERPL-MCNC: 0.6 MG/DL (ref 0.2–1.3)
BUN SERPL-MCNC: 11 MG/DL (ref 7–30)
CALCIUM SERPL-MCNC: 9.7 MG/DL (ref 8.5–10.1)
CHLORIDE SERPL-SCNC: 107 MMOL/L (ref 94–109)
CK SERPL-CCNC: 315 U/L (ref 30–300)
CO2 SERPL-SCNC: 29 MMOL/L (ref 20–32)
CREAT SERPL-MCNC: 0.9 MG/DL (ref 0.66–1.25)
GFR SERPL CREATININE-BSD FRML MDRD: >90 ML/MIN/{1.73_M2}
GLUCOSE SERPL-MCNC: 95 MG/DL (ref 70–99)
POTASSIUM SERPL-SCNC: 4.2 MMOL/L (ref 3.4–5.3)
PROT SERPL-MCNC: 7.8 G/DL (ref 6.8–8.8)
SODIUM SERPL-SCNC: 140 MMOL/L (ref 133–144)

## 2021-05-18 PROCEDURE — 99000 SPECIMEN HANDLING OFFICE-LAB: CPT | Performed by: INTERNAL MEDICINE

## 2021-05-18 PROCEDURE — 82550 ASSAY OF CK (CPK): CPT | Performed by: INTERNAL MEDICINE

## 2021-05-18 PROCEDURE — 82085 ASSAY OF ALDOLASE: CPT | Mod: 90 | Performed by: INTERNAL MEDICINE

## 2021-05-18 PROCEDURE — 80053 COMPREHEN METABOLIC PANEL: CPT | Performed by: INTERNAL MEDICINE

## 2021-05-18 PROCEDURE — 36415 COLL VENOUS BLD VENIPUNCTURE: CPT | Performed by: INTERNAL MEDICINE

## 2021-05-18 RX ORDER — ATORVASTATIN CALCIUM 40 MG/1
40 TABLET, FILM COATED ORAL DAILY
Qty: 1 TABLET | Refills: 0 | COMMUNITY
Start: 2021-05-18 | End: 2021-09-09

## 2021-05-19 ENCOUNTER — MYC MEDICAL ADVICE (OUTPATIENT)
Dept: FAMILY MEDICINE | Facility: CLINIC | Age: 36
End: 2021-05-19

## 2021-05-19 ENCOUNTER — MYC MEDICAL ADVICE (OUTPATIENT)
Dept: CARDIOLOGY | Facility: CLINIC | Age: 36
End: 2021-05-19

## 2021-05-19 LAB — ALDOLASE SERPL-CCNC: 6.8 U/L (ref 1.5–8.1)

## 2021-05-19 NOTE — TELEPHONE ENCOUNTER
My Chart message from 5-19-21  Hi Dr Mar,  I see that my blood test results are back.  Did you find out anything noteworthy?  Also, the last time I took the statin was on Sunday.  Today when I played hockey, I definitely felt some tightness/strain in my left groin that made me take it easy, so I didn't pull it.  How long do you think it should take for my muscles to stop doing this?     Thanks  Roberto    Labs from 5-5-21 and and 5-18-21 completed awaiting Aldolase results  Echo and CTa coronary  5-20-21 pending.

## 2021-05-20 ENCOUNTER — HOSPITAL ENCOUNTER (OUTPATIENT)
Dept: CARDIOLOGY | Facility: CLINIC | Age: 36
End: 2021-05-20
Attending: INTERNAL MEDICINE
Payer: COMMERCIAL

## 2021-05-20 ENCOUNTER — TELEPHONE (OUTPATIENT)
Dept: CARDIOLOGY | Facility: CLINIC | Age: 36
End: 2021-05-20

## 2021-05-20 ENCOUNTER — MYC MEDICAL ADVICE (OUTPATIENT)
Dept: CARDIOLOGY | Facility: CLINIC | Age: 36
End: 2021-05-20

## 2021-05-20 VITALS — HEART RATE: 55 BPM | SYSTOLIC BLOOD PRESSURE: 93 MMHG | DIASTOLIC BLOOD PRESSURE: 52 MMHG

## 2021-05-20 DIAGNOSIS — R94.31 ABNORMAL ELECTROCARDIOGRAM: ICD-10-CM

## 2021-05-20 DIAGNOSIS — E78.00 HYPERCHOLESTEROLEMIA: ICD-10-CM

## 2021-05-20 DIAGNOSIS — Z83.42 FAMILY HISTORY OF HYPERCHOLESTEROLEMIA: ICD-10-CM

## 2021-05-20 DIAGNOSIS — Z82.49 FAMILY HISTORY OF PREMATURE CORONARY ARTERY DISEASE: ICD-10-CM

## 2021-05-20 DIAGNOSIS — R07.2 PRECORDIAL PAIN: ICD-10-CM

## 2021-05-20 PROCEDURE — 93306 TTE W/DOPPLER COMPLETE: CPT

## 2021-05-20 PROCEDURE — 75574 CT ANGIO HRT W/3D IMAGE: CPT | Mod: 26 | Performed by: INTERNAL MEDICINE

## 2021-05-20 PROCEDURE — 250N000013 HC RX MED GY IP 250 OP 250 PS 637: Performed by: INTERNAL MEDICINE

## 2021-05-20 PROCEDURE — 250N000011 HC RX IP 250 OP 636: Performed by: INTERNAL MEDICINE

## 2021-05-20 PROCEDURE — 258N000003 HC RX IP 258 OP 636: Performed by: INTERNAL MEDICINE

## 2021-05-20 PROCEDURE — 75574 CT ANGIO HRT W/3D IMAGE: CPT

## 2021-05-20 PROCEDURE — 250N000009 HC RX 250: Performed by: INTERNAL MEDICINE

## 2021-05-20 PROCEDURE — 93306 TTE W/DOPPLER COMPLETE: CPT | Mod: 26 | Performed by: INTERNAL MEDICINE

## 2021-05-20 RX ORDER — NITROGLYCERIN 0.4 MG/1
0.4 TABLET SUBLINGUAL
Status: DISCONTINUED | OUTPATIENT
Start: 2021-05-20 | End: 2021-05-21 | Stop reason: HOSPADM

## 2021-05-20 RX ORDER — DIPHENHYDRAMINE HYDROCHLORIDE 50 MG/ML
25-50 INJECTION INTRAMUSCULAR; INTRAVENOUS
Status: DISCONTINUED | OUTPATIENT
Start: 2021-05-20 | End: 2021-05-21 | Stop reason: HOSPADM

## 2021-05-20 RX ORDER — IVABRADINE 5 MG/1
5-15 TABLET, FILM COATED ORAL
Status: DISCONTINUED | OUTPATIENT
Start: 2021-05-20 | End: 2021-05-21 | Stop reason: HOSPADM

## 2021-05-20 RX ORDER — ONDANSETRON 2 MG/ML
4 INJECTION INTRAMUSCULAR; INTRAVENOUS
Status: DISCONTINUED | OUTPATIENT
Start: 2021-05-20 | End: 2021-05-21 | Stop reason: HOSPADM

## 2021-05-20 RX ORDER — IOPAMIDOL 755 MG/ML
500 INJECTION, SOLUTION INTRAVASCULAR ONCE
Status: COMPLETED | OUTPATIENT
Start: 2021-05-20 | End: 2021-05-20

## 2021-05-20 RX ORDER — METOPROLOL TARTRATE 25 MG/1
25-100 TABLET, FILM COATED ORAL
Status: DISCONTINUED | OUTPATIENT
Start: 2021-05-20 | End: 2021-05-21 | Stop reason: HOSPADM

## 2021-05-20 RX ORDER — DILTIAZEM HYDROCHLORIDE 5 MG/ML
10-15 INJECTION INTRAVENOUS
Status: DISCONTINUED | OUTPATIENT
Start: 2021-05-20 | End: 2021-05-21 | Stop reason: HOSPADM

## 2021-05-20 RX ORDER — DILTIAZEM HCL 60 MG
120 TABLET ORAL
Status: DISCONTINUED | OUTPATIENT
Start: 2021-05-20 | End: 2021-05-21 | Stop reason: HOSPADM

## 2021-05-20 RX ORDER — METOPROLOL TARTRATE 1 MG/ML
5-15 INJECTION, SOLUTION INTRAVENOUS
Status: DISCONTINUED | OUTPATIENT
Start: 2021-05-20 | End: 2021-05-21 | Stop reason: HOSPADM

## 2021-05-20 RX ORDER — ACYCLOVIR 200 MG/1
0-1 CAPSULE ORAL
Status: DISCONTINUED | OUTPATIENT
Start: 2021-05-20 | End: 2021-05-21 | Stop reason: HOSPADM

## 2021-05-20 RX ORDER — DIPHENHYDRAMINE HCL 25 MG
25 CAPSULE ORAL
Status: DISCONTINUED | OUTPATIENT
Start: 2021-05-20 | End: 2021-05-21 | Stop reason: HOSPADM

## 2021-05-20 RX ORDER — METHYLPREDNISOLONE SODIUM SUCCINATE 125 MG/2ML
125 INJECTION, POWDER, LYOPHILIZED, FOR SOLUTION INTRAMUSCULAR; INTRAVENOUS
Status: DISCONTINUED | OUTPATIENT
Start: 2021-05-20 | End: 2021-05-21 | Stop reason: HOSPADM

## 2021-05-20 RX ADMIN — NITROGLYCERIN 0.4 MG: 0.4 TABLET SUBLINGUAL at 11:33

## 2021-05-20 RX ADMIN — SODIUM CHLORIDE 100 ML: 9 INJECTION, SOLUTION INTRAVENOUS at 12:00

## 2021-05-20 RX ADMIN — IOPAMIDOL 115 ML: 755 INJECTION, SOLUTION INTRAVENOUS at 12:00

## 2021-05-20 RX ADMIN — METOPROLOL TARTRATE 5 MG: 1 INJECTION, SOLUTION INTRAVENOUS at 11:41

## 2021-05-20 NOTE — TELEPHONE ENCOUNTER
Alexandr Mar MD Anderson, Barbara E, RN; [facility] Heart Team 2 22 minutes ago (11:39 AM)       1.  Agree with holding statin.  2.  Please update patient that I will discuss his questions at his clinic visit tomorrow.    Thank you.  Dr. Zaid Hall message sent to patient.

## 2021-05-20 NOTE — TELEPHONE ENCOUNTER
Patient called with concerns about muscle weakness with his hockey workouts. Statin was stopped and he is hydrating.    Component      Latest Ref Rng & Units 5/18/2021   Sodium      133 - 144 mmol/L 140   Potassium      3.4 - 5.3 mmol/L 4.2   Chloride      94 - 109 mmol/L 107   Carbon Dioxide      20 - 32 mmol/L 29   Anion Gap      3 - 14 mmol/L 4   Glucose      70 - 99 mg/dL 95   Urea Nitrogen      7 - 30 mg/dL 11   Creatinine      0.66 - 1.25 mg/dL 0.90   GFR Estimate      >60 mL/min/1.73:m2 >90   GFR Estimate If Black      >60 mL/min/1.73:m2 >90   Calcium      8.5 - 10.1 mg/dL 9.7   Bilirubin Total      0.2 - 1.3 mg/dL 0.6   Albumin      3.4 - 5.0 g/dL 4.3   Protein Total      6.8 - 8.8 g/dL 7.8   Alkaline Phosphatase      40 - 150 U/L 94   ALT      0 - 70 U/L 51   AST      0 - 45 U/L 38   Aldolase      1.5 - 8.1 U/L 6.8   CK Total      30 - 300 U/L 315 (H)     Patient has office visit 5/21/2021 to review echo and CTa (not done yet).    Will message Dr. Mar to see if he can resume activities before his OV?

## 2021-05-20 NOTE — TELEPHONE ENCOUNTER
Unfortunately I cannot give you a reason that this is occurring and therefore I cannot think of something else you can change to prevent the process.       I don't think the hockey helmet , your sweating or another environmental factor that you can change.     I would recommend regular skin care with use of facial wash after hockey and at bedtime. Cetaphil or Cerave facial products are excellent.       Regards,   Angie Small M.D.

## 2021-05-21 ENCOUNTER — MYC MEDICAL ADVICE (OUTPATIENT)
Dept: CARDIOLOGY | Facility: CLINIC | Age: 36
End: 2021-05-21

## 2021-05-21 ENCOUNTER — TELEPHONE (OUTPATIENT)
Dept: CARDIOLOGY | Facility: CLINIC | Age: 36
End: 2021-05-21

## 2021-05-21 NOTE — TELEPHONE ENCOUNTER
"Digital Dandelion message routed to Roberto Jeffrey [facility] [place] Heart Team 2   Phone Number: [phone]             Hi Dr Mar,   Sorry we missed each other today.  I was waiting by my phone, but never got a call or [employer] Du[employer].  Looks like we need to reschedule, but your schedule is quite busy and it will be a while before we can see each other.  I saw that all of the results came back normal, but there was one thing that said \" Please review Radiology report for incidental noncardiac findings that will follow separately.\"  This sounds a bit ominous, and the Radiology results have not posted yet.  Can you let me know what the incidental noncardiac findings were?  Should I be concerned?  Also, do you have any concerns about the heart results?     I do appreciate if you can respond, as I'm a bit nervous about the results.     Thank you   Roberto"

## 2021-05-21 NOTE — TELEPHONE ENCOUNTER
Miproto message sent to patient with Dr Mar's reply-    Alexandr Mar MD Hiljus, Yamile HILLS RN    Cc: ARIANNA [facility] Heart Team 2   Caller: Unspecified (Today,  2:00 PM)   Phone Number: [phone]             Hello    1.  I am sorry we missed each other today.  According to the rooming staff, they tried the number many times.  Please make sure we have his right number on file.  Please explain to patient that it is challenging to discuss results via messaging.  2.  Briefly, there is no concerning findings on his cardiac testing (CT angiogram and echocardiogram).  3.  I do not think there is anything concerning on his extracardiac findings either.  4.  We do need a strategy for addressing his elevated cholesterol/LDL.  But because his coronary angiogram is normal, and he did not tolerate the statins, there is no urgency for this.  I prefer to have a discussion about this.  He can have a non-urgent visit with me at the next available slot to discuss hyperlipidemia management.    Thank you.  Dr. Mar        Scheduling contact information provided to patient as well to reschedule visit.

## 2021-05-21 NOTE — TELEPHONE ENCOUNTER
Message from scheduling:  This patient missed his VV today w/Dr Mar and per her message she wants to see him in person. Next available is in September and the pt wants a My Chart message why she wants to see him in person, said he is nervous and wants to know what is going on. He did not reschedule     Thank you       Karon     Will message Dr. Mar to review

## 2021-05-22 NOTE — PROGRESS NOTES
[facility] - PRIMARY CARE SKIN    CC: skin cancer screening (full-body)  SUBJECTIVE:   Yahir Phillips is a(n) 35 year old male who presents to clinic today for removal of lesion on the mid back  that is irritated. Removal of lesion on the right chest wall because of irregular pigmentation      Personal Medical History  Skin cancer: NO  Eczema Psoriasis Lupus   NO NO NO     Family Medical History  Skin cancer: NO  Eczema Psoriasis Lupus   NO NO NO     Sun Protection : SPF 30-50    Occupation: indoor ().  Refer to electronic medical record (EMR) for past medical history and medications.    ROS: 14 point review of systems was negative except the symptoms listed above in the HPI.    OBJECTIVE:   GENERAL: healthy, alert and no distress.  HEENT: PERRL. Conjunctiva, sclera clear.  SKIN: Corey Skin Type - I.           Right lateral chest wall- 4 mm irregular pigmented macule ? Atypical ? other               Mid line of the mid back- 6 mm coarse textured light brown lesion with 2 mm circular brown pigmentation ? Seborrheic keratosis with collision of benign nevus ? Other            ASSESSMENT:     Encounter Diagnosis   Name Primary?     Neoplasm of uncertain behavior of skin Yes         PLAN:   Patient Instructions   FUTURE APPOINTMENTS  Follow up per pathology report. You will be notified, generally via letter or MyChart, in approximately 10 days. If there is anything we need to discuss or further treatment needed, I will call you to discuss it.    Can shower normally.   Vaseline and a bandage for 5-7 days.     Patient can also exercise.      WOUND CARE INSTRUCTIONS  1. Wash hands before every dressing change.  2. Change the dressing after 24 hours and once daily, or earlier if it becomes saturated.  3. Wash the wound area with a mild soap, then rinse.  4. Gently pat dry with a sterile gauze or Q-tip.  5. Using a Q-tip, apply Vaseline or Aquaphor only over entire wound. DO NOT use Neosporin - as many people react  "to neomycin.  6. Finally, cover with a bandage or sterile non-stick gauze with micropore paper tape.  7. Repeat once daily until wound has healed.      Soap, water and shampoo will not hurt this area.    Do not go swimming or take baths, but showering is encouraged.    Limit use of the area where the procedure was done for a few days to allow for optimal healing.    Signs of Infection:  Infection can occur in any area where skin has been disrupted. If you notice persistent redness, swelling, colored drainage, increasing pain, fever or other signs of infection, please call us at: [phone] and ask to have me or my colleague paged. We will call you back to discuss.    If you experience bleeding:  Wash hands and hold firm pressure on the area for 10 minutes without checking to see if the bleeding has stopped. \"Checking\" pulls off the protective wound clot and restarts the bleeding all over again. Re-apply pressure for 10 minutes if necessary to stop bleeding.  Use additional sterile gauze and tape to maintain pressure once bleeding has stopped.  If bleeding continues, then call back to clinic at [phone].    PATIENT INFORMATION : WOUNDS  During the healing process you will notice a number of changes.     All wounds normally drain.  The larger the wound the more drainage there will be.  After 7-10 days, you will notice the wound beginning to shrink and new skin will begin to grow.  The wound is healed when you can see that skin has formed over the entire area.  A healed wound has a healthy, shiny look to the surface and is red to dark pink in color to normalize.  Wounds may take approximately 4-6 weeks to heal.  Larger wounds may take 6-8 weeks. After the wound is healed you may discontinue dressing changes.    All wounds develop a small halo of redness surrounding the wound which means that healing is occurring. Severe itching with extensive redness usually indicates sensitivity to the ointment or bandage " tape used to dress the wound.  You should call our office if severe itching with extensive redness develops.    Swelling  and/or discoloration around your surgical site is common, particularly when performed around the eye.  You may experience a sensation of tightness as your wound heals. This is normal and will gradually subside.  Your healed wound may be sensitive to temperature changes. This sensitivity improves with time, but if you re having a lot of discomfort, try to avoid temperature extremes.  Patients frequently experience itching after their wound appears to have healed because of the continue healing under the skin.  Plain Vaseline will help relieve the itching.        The patient was counseled about sunscreens and sun avoidance. The patient was counseled to check the skin regularly and report any lesion that is new, changing, itching, scabbing, bleeding or otherwise bothersome. The patient was discharged ambulatory and in stable condition.    Educational brochures given to patient: skin cancer.

## 2021-05-24 ENCOUNTER — OFFICE VISIT (OUTPATIENT)
Dept: FAMILY MEDICINE | Facility: CLINIC | Age: 36
End: 2021-05-24
Payer: COMMERCIAL

## 2021-05-24 VITALS — DIASTOLIC BLOOD PRESSURE: 68 MMHG | SYSTOLIC BLOOD PRESSURE: 116 MMHG

## 2021-05-24 DIAGNOSIS — D48.5 NEOPLASM OF UNCERTAIN BEHAVIOR OF SKIN: Primary | ICD-10-CM

## 2021-05-24 PROCEDURE — 11300 SHAVE SKIN LESION 0.5 CM/<: CPT | Mod: 51 | Performed by: FAMILY MEDICINE

## 2021-05-24 PROCEDURE — 88305 TISSUE EXAM BY PATHOLOGIST: CPT | Performed by: PATHOLOGY

## 2021-05-24 PROCEDURE — 88342 IMHCHEM/IMCYTCHM 1ST ANTB: CPT | Performed by: PATHOLOGY

## 2021-05-24 PROCEDURE — 99207 PR NO CHARGE LOS: CPT | Performed by: FAMILY MEDICINE

## 2021-05-24 PROCEDURE — 11301 SHAVE SKIN LESION 0.6-1.0 CM: CPT | Performed by: FAMILY MEDICINE

## 2021-05-24 NOTE — LETTER
5/24/2021         RE: Yahir Phillips  [address]  [place] [address]        Dear Colleague,    [facility] Please see a copy of my visit note below.    [facility] - PRIMARY CARE SKIN    CC: skin cancer screening (full-body)  SUBJECTIVE:   Yahir Phillips is a(n) 35 year old male who presents to clinic today for removal of lesion on the mid back  that is irritated. Removal of lesion on the right chest wall because of irregular pigmentation      Personal Medical History  Skin cancer: NO  Eczema Psoriasis Lupus   NO NO NO     Family Medical History  Skin cancer: NO  Eczema Psoriasis Lupus   NO NO NO     Sun Protection : SPF 30-50    Occupation: indoor ().  Refer to electronic medical record (EMR) for past medical history and medications.    ROS: 14 point review of systems was negative except the symptoms listed above in the HPI.    OBJECTIVE:   GENERAL: healthy, alert and no distress.  HEENT: PERRL. Conjunctiva, sclera clear.  SKIN: Corey Skin Type - I.           Right lateral chest wall- 4 mm irregular pigmented macule ? Atypical ? other               Mid line of the mid back- 6 mm coarse textured light brown lesion with 2 mm circular brown pigmentation ? Seborrheic keratosis with collision of benign nevus ? Other            ASSESSMENT:     Encounter Diagnosis   Name Primary?     Neoplasm of uncertain behavior of skin Yes         PLAN:   Patient Instructions   FUTURE APPOINTMENTS  Follow up per pathology report. You will be notified, generally via letter or MyChart, in approximately 10 days. If there is anything we need to discuss or further treatment needed, I will call you to discuss it.    Can shower normally.   Vaseline and a bandage for 5-7 days.     Patient can also exercise.      WOUND CARE INSTRUCTIONS  1. Wash hands before every dressing change.  2. Change the dressing after 24 hours and once  "daily, or earlier if it becomes saturated.  3. Wash the wound area with a mild soap, then rinse.  4. Gently pat dry with a sterile gauze or Q-tip.  5. Using a Q-tip, apply Vaseline or Aquaphor only over entire wound. DO NOT use Neosporin - as many people react to neomycin.  6. Finally, cover with a bandage or sterile non-stick gauze with micropore paper tape.  7. Repeat once daily until wound has healed.      Soap, water and shampoo will not hurt this area.    Do not go swimming or take baths, but showering is encouraged.    Limit use of the area where the procedure was done for a few days to allow for optimal healing.    Signs of Infection:  Infection can occur in any area where skin has been disrupted. If you notice persistent redness, swelling, colored drainage, increasing pain, fever or other signs of infection, please call us at: [phone] and ask to have me or my colleague paged. We will call you back to discuss.    If you experience bleeding:  Wash hands and hold firm pressure on the area for 10 minutes without checking to see if the bleeding has stopped. \"Checking\" pulls off the protective wound clot and restarts the bleeding all over again. Re-apply pressure for 10 minutes if necessary to stop bleeding.  Use additional sterile gauze and tape to maintain pressure once bleeding has stopped.  If bleeding continues, then call back to clinic at [phone].    PATIENT INFORMATION : WOUNDS  During the healing process you will notice a number of changes.     All wounds normally drain.  The larger the wound the more drainage there will be.  After 7-10 days, you will notice the wound beginning to shrink and new skin will begin to grow.  The wound is healed when you can see that skin has formed over the entire area.  A healed wound has a healthy, shiny look to the surface and is red to dark pink in color to normalize.  Wounds may take approximately 4-6 weeks to heal.  Larger wounds may take 6-8 weeks. After " the wound is healed you may discontinue dressing changes.    All wounds develop a small halo of redness surrounding the wound which means that healing is occurring. Severe itching with extensive redness usually indicates sensitivity to the ointment or bandage tape used to dress the wound.  You should call our office if severe itching with extensive redness develops.    Swelling  and/or discoloration around your surgical site is common, particularly when performed around the eye.  You may experience a sensation of tightness as your wound heals. This is normal and will gradually subside.  Your healed wound may be sensitive to temperature changes. This sensitivity improves with time, but if you re having a lot of discomfort, try to avoid temperature extremes.  Patients frequently experience itching after their wound appears to have healed because of the continue healing under the skin.  Plain Vaseline will help relieve the itching.        The patient was counseled about sunscreens and sun avoidance. The patient was counseled to check the skin regularly and report any lesion that is new, changing, itching, scabbing, bleeding or otherwise bothersome. The patient was discharged ambulatory and in stable condition.    Educational brochures given to patient: skin cancer.            Again, thank you for allowing me to participate in the care of your patient.        Sincerely,        Angie Small MD

## 2021-05-24 NOTE — PROCEDURES
Name : Shave Excision  Indication : Excision of tissue for pathology evaluation.  Location(s) :    Right lateral chest wall- 4 mm irregular pigmented macule ? Atypical ? other    Completed by : Simran Small MD  Photo Taken : yes.  Anesthesia : Patient was anesthetized by infiltrating the area surrounding the lesion with 1% lidocaine.   epinephrine [id number] : Yes.  Note : Discussed the risk of pain, infection, scarring, hypo- or hyperpigmentation and recurrence or need for re-treatment. The benefits of treatment and alternative treatments were also discussed.    During this procedure, the universal protocol was utilized. The patient's identity was confirmed by no less than two patient identifiers, correct procedure was verified, correct site was verified and marked as applicable and a final pause was completed.    Sterile technique was used throughout the procedure. The skin was cleaned and prepped with surgical cleanser. Once adequate anesthesia was obtained, the lesion was removed with a deep scallop shave procedure. The specimen was sent to pathology.    Direct pressure and aluminum chloride and monopolar cautery was applied for hemostasis. No bleeding was present upon the completion of the procedure. The wound was coated with antibacterial ointment. A dry sterile dressing was applied. Patient tolerated the procedure well and left in satisfactory condition.    Primary provider and referring provider will be informed regarding the tissue report when it returns.      Name : Shave Excision  Indication : Excision of tissue for pathology evaluation.  Location(s) : Mid line of the mid back- 6 mm coarse textured light brown lesion with 2 mm circular brown pigmentation ? Seborrheic keratosis with collision of benign nevus ? Other  Completed by : Simran Small MD  Photo Taken : yes.  Anesthesia : Patient was anesthetized by infiltrating the area surrounding the lesion with 1% lidocaine.   epinephrine [id number] : Yes.  Note  : Discussed the risk of pain, infection, scarring, hypo- or hyperpigmentation and recurrence or need for re-treatment. The benefits of treatment and alternative treatments were also discussed.    During this procedure, the universal protocol was utilized. The patient's identity was confirmed by no less than two patient identifiers, correct procedure was verified, correct site was verified and marked as applicable and a final pause was completed.    Sterile technique was used throughout the procedure. The skin was cleaned and prepped with surgical cleanser. Once adequate anesthesia was obtained, the lesion was removed with a deep scallop shave procedure. The specimen was sent to pathology.    Direct pressure and aluminum chloride and monopolar cautery was applied for hemostasis. No bleeding was present upon the completion of the procedure. The wound was coated with antibacterial ointment. A dry sterile dressing was applied. Patient tolerated the procedure well and left in satisfactory condition.    Primary provider and referring provider will be informed regarding the tissue report when it returns.

## 2021-05-24 NOTE — PATIENT INSTRUCTIONS
"FUTURE APPOINTMENTS  Follow up per pathology report. You will be notified, generally via letter or MyChart, in approximately 10 days. If there is anything we need to discuss or further treatment needed, I will call you to discuss it.    Can shower normally.   Vaseline and a bandage for 5-7 days.     Patient can also exercise.      WOUND CARE INSTRUCTIONS  1. Wash hands before every dressing change.  2. Change the dressing after 24 hours and once daily, or earlier if it becomes saturated.  3. Wash the wound area with a mild soap, then rinse.  4. Gently pat dry with a sterile gauze or Q-tip.  5. Using a Q-tip, apply Vaseline or Aquaphor only over entire wound. DO NOT use Neosporin - as many people react to neomycin.  6. Finally, cover with a bandage or sterile non-stick gauze with micropore paper tape.  7. Repeat once daily until wound has healed.      Soap, water and shampoo will not hurt this area.    Do not go swimming or take baths, but showering is encouraged.    Limit use of the area where the procedure was done for a few days to allow for optimal healing.    Signs of Infection:  Infection can occur in any area where skin has been disrupted. If you notice persistent redness, swelling, colored drainage, increasing pain, fever or other signs of infection, please call us at: [phone] and ask to have me or my colleague paged. We will call you back to discuss.    If you experience bleeding:  Wash hands and hold firm pressure on the area for 10 minutes without checking to see if the bleeding has stopped. \"Checking\" pulls off the protective wound clot and restarts the bleeding all over again. Re-apply pressure for 10 minutes if necessary to stop bleeding.  Use additional sterile gauze and tape to maintain pressure once bleeding has stopped.  If bleeding continues, then call back to clinic at [phone].    PATIENT INFORMATION : WOUNDS  During the healing process you will notice a number of changes.     All " wounds normally drain.  The larger the wound the more drainage there will be.  After 7-10 days, you will notice the wound beginning to shrink and new skin will begin to grow.  The wound is healed when you can see that skin has formed over the entire area.  A healed wound has a healthy, shiny look to the surface and is red to dark pink in color to normalize.  Wounds may take approximately 4-6 weeks to heal.  Larger wounds may take 6-8 weeks. After the wound is healed you may discontinue dressing changes.    All wounds develop a small halo of redness surrounding the wound which means that healing is occurring. Severe itching with extensive redness usually indicates sensitivity to the ointment or bandage tape used to dress the wound.  You should call our office if severe itching with extensive redness develops.    Swelling  and/or discoloration around your surgical site is common, particularly when performed around the eye.  You may experience a sensation of tightness as your wound heals. This is normal and will gradually subside.  Your healed wound may be sensitive to temperature changes. This sensitivity improves with time, but if you re having a lot of discomfort, try to avoid temperature extremes.  Patients frequently experience itching after their wound appears to have healed because of the continue healing under the skin.  Plain Vaseline will help relieve the itching.

## 2021-05-26 LAB — COPATH REPORT: NORMAL

## 2021-05-28 ENCOUNTER — TELEPHONE (OUTPATIENT)
Dept: DERMATOLOGY | Facility: CLINIC | Age: 36
End: 2021-05-28

## 2021-05-28 NOTE — TELEPHONE ENCOUNTER
Patient called back.    Educated patient on biopsy results- compound melanocytic nevus with mild atypia (benign) + SK (benign).    Educated patient on atypical moles- mild, moderate, and severe.    Advised patient to call back if lesion on right lateral chest wall (compound melanocytic nevus with mild atypia) returns/repigments.    Patient voiced understanding.    ED Davis-BSN-N  [facility]  [phone]

## 2021-05-28 NOTE — TELEPHONE ENCOUNTER
Called and LM for patient to call back in regards to biopsy results elian Davis RN-BSN-PHN  [facility]  [phone]

## 2021-05-28 NOTE — TELEPHONE ENCOUNTER
----- Message from Angie Small MD sent at 5/27/2021 10:54 AM CDT -----  Please call,       Right lateral chest wall- mild atypical changes.Explain to patient.       Back- benign seborrheic keratosis     Thank you,   Angie Small M.D.

## 2021-06-03 ENCOUNTER — TELEPHONE (OUTPATIENT)
Dept: CARDIOLOGY | Facility: CLINIC | Age: 36
End: 2021-06-03

## 2021-06-03 NOTE — TELEPHONE ENCOUNTER
Alexandr Mar MD Hiljus, Audrey G RN   Cc: ARIANNA Corbin [facility] 2   Caller: Unspecified (Today,  9:48 AM)             Sure.  The only issue is that he missed his last virtual appointment because he would not answer the phone.    Thanks        Message to scheduling to set up vitual visit w/ Dr Mar, next available.

## 2021-06-03 NOTE — TELEPHONE ENCOUNTER
----- Message from Yamile Zurita RN sent at 6/2/2021  1:01 PM CDT -----    ----- Message -----  From: Debbi Alfaro  Sent: 6/2/2021  12:18 PM CDT  To: Yamile Zurita RN    Pt is adamant he won't come into the clinic- his work schedule only allows his to do video visits.   Can someone please call him and explain to him why Dr. Mar wants an in clinic visit? He wouldn't accept what I said.   He wouldn't set up an appt until he hears more.        Thanks!   -Debbi

## 2021-06-08 ENCOUNTER — OFFICE VISIT (OUTPATIENT)
Dept: CARDIOLOGY | Facility: CLINIC | Age: 36
End: 2021-06-08
Attending: GENETIC COUNSELOR, MS
Payer: COMMERCIAL

## 2021-06-08 DIAGNOSIS — Z82.49 FAMILY HISTORY OF PREMATURE CORONARY ARTERY DISEASE: ICD-10-CM

## 2021-06-08 DIAGNOSIS — R94.31 ABNORMAL ELECTROCARDIOGRAM: ICD-10-CM

## 2021-06-08 DIAGNOSIS — Z83.42 FAMILY HISTORY OF HYPERCHOLESTEROLEMIA: ICD-10-CM

## 2021-06-08 DIAGNOSIS — E78.00 HYPERCHOLESTEROLEMIA: ICD-10-CM

## 2021-06-08 DIAGNOSIS — R07.2 PRECORDIAL PAIN: ICD-10-CM

## 2021-06-08 NOTE — LETTER
2021      RE: Yahir Phillips  [address]  [place] [address]       Dear Colleague,    Thank you for the opportunity to participate in the care of your patient, Yahir Phillips, at the [facility] [place] [facility]. Please see a copy of my visit note below.    Here is a copy of the progress note from your recent genetic counseling visit to [facility] on Date: 2021.    PROGRESS NOTE:Roberto was referred by Dr. Mar for genetic counseling due to his history of high cholesterol and the suspicion of familial hypercholesterolemia (FH).  I had the opportunity to talk with Roberto today to discuss the genetic component of FH and testing options available to him.     MEDICAL HISTORY: Roberto reports that he was diagnosed with high cholesterol since 20 years of age.  He recalls that his cholesterol levels have been over 200 for over a decade.  He has tried medications off and on but recent statins were causing significant muscle cramps.      Most recent lipids (21 showed total cholesterol at 245, HDL at 48, LDL at 167, and triglycerides at 157.    FAMILY HISTORY:A detailed family history was obtained today and was significant for the following cardiac history:      Roberto's father is 67 years of age.  He has a history of hypertension and coronary artery disease (CAD).  He had 2 stents placed.    A paternal uncle had a heart attack in his 50's with  Multiple MI's since.  He had stents placed.    Another uncle is known to have high cholesterol.      Two paternal aunts are in good health and two other uncles  after being hit by a car in two separate accidents.    Paternal grandfather  in his 50's.  He had polio but cause of death is unclear.      Paternal grandmother  in her 80's from a heart attack but she was known to have CAD and had a heart attack and bypass surgery in her  60's.    Roberto's mother has cholesterol levels in the 300's but has no known CAD.  There are no heart issues known in her family.  There is no additional history of high cholesterol, CAD, heart attacks, fainting, sudden cardiac death, genetic conditions, or birth defects. (Scanned pedigree may be under media tab)    DISCUSSION: High cholesterol can be caused by both genetic and non-genetic factors.  There are lots of well known diet and lifestyle choices that are known to increase risk for high cholesterol.  However, in some families, there is an underlying genetic predisposition for high cholesterol.  Familial hypercholesterolemia (FH) is one such genetic condition.  FH is characterized by abnormally high concentrations of low density lipoprotein cholesterol (LDL-C) in the blood since birth, which predisposes affected individuals to premature coronary heart disease (CHD), stroke, and death. Individuals with FH have a significantly increased risk for CHD, estimated to be 20 times greater than that of the general population. If untreated, men with FH have a 50% risk and women with FH have a 30% risk of having a cardiovascular event by ages 50 and 60 years, respectively. The prevalence of FH is estimated to be around 1:250  individuals in the general population, with an even higher prevalence in certain ethnic groups.     Heterozygous FH (HeFH) is inherited in an autosomal dominant manner, meaning that only one mutation is needed to cause disease. Almost all affected individuals inherit the familial mutation from an affected parent. In addition, all children of an individual with HeFH have a 50% chance of inheriting the mutation. Homozygous FH (HoFH) is rare (prevalence 1:1,000,000) and occurs when an individual inherits two mutations. Therefore, if both parents have HeFH, each of their children would have a 25% risk of inheriting HoFH and a 50% risk of inheriting HeFH.    A diagnosis of FH is typically made on a  clinical basis. There are several sets of validated diagnostic criteria available, including the [place] MEDPED Program, the [place] FH Registry, and the [place] [facility]. Based on these models, Roberto falls into the possible FH category.  However, we have limited information about family members cholesterol levels or medical details.      Genetic testing is also available to confirm the diagnosis. Currently 4 genes have been found to be related to FH. Genetic testing currently identifies mutations in greater than 90% of cases meeting clinical criteria. Reviewed capabilities, limitations, and logistics of testing.  DNA sample via saliva or blood is collected and sent to testing lab for evaluation of selected genes. The results could directly impact care and treatment.      Explained three possible outcomes of genetic testing including: positive identification of a mutation, no mutation identified, and identification of a variant of unknown significance (VUS). If a mutation is identified, presymptomatic testing would be available to at risk family members. If no mutation is identified, it does not rule out the possibility of a genetic component to this disease. Family members could still be at risk for developing the same condition. If a VUS is identified, it is unclear if the mutation is disease causing or just a normal variation. It may take time and possibly additional testing to determine the meaning of a VUS result.     Test results take approximately 2-4 weeks on average. Discussed cost of testing through commercial labs. Explained that the lab will work with insurance to determine coverage and contact patient if out of pocket costs are expected to exceed $100.     Discussed pros and cons of genetic testing. Explained that results could determine the cause for FH. If a mutation is identified, presymptomatic testing is available to all at risk relatives. Reviewed possible issues associated with  presymptomatic testing including genetic discrimination, current laws to prevent discrimination (ie. [place]), insurance issues, and emotional and psychosocial outcomes of testing.     Due to the familial risk, we also recommend that at risk family members undergo lipid or molecular analysis of cholesterol levels.     All questions answered at this time.     PLAN: Roberto elected to proceed with genetic testing but due to time constraints he will have kit sent to his home.  Requisition and consent forms were completed and signed.  DNA will be collected via saliva sample and sent to [facility] laboratory. I will contact patient when results are available.    TOTAL TIME SPENT IN COUNSELIN minutes    Keren Low MS, [facility]  Licensed, Certified Genetic Counselor  [facility]       Please do not hesitate to contact me if you have any questions/concerns.     Sincerely,     Keren Low GC

## 2021-06-08 NOTE — PATIENT INSTRUCTIONS
"Indication for Genetic Counseling:       High cholesterol is often passed on in families as a multifactorial condition, meaning that both genetic and non-genetic factors play a role. Genetic testing for this form of high cholesterol is not available.  However, one type of high cholesterol is due to a genetic condition call familial hypercholesterolemia (FH).  This condition is characterized by abnormally high concentrations of low density lipoprotein cholesterol (LDL-C) in the blood since birth.  FH predisposes affected individuals to premature coronary heart disease (CHD), stroke, and death.     Individuals with FH have a significantly increased risk for CHD, estimated to be 20 times greater than that of the general population. If untreated, men with FH have a 50% risk for a cardiac event by age 50 and women with FH have a 30% risk of having a cardiovascular event by age 60 years, respectively. The prevalence of FH is estimated to be around 1:250  individuals in the general population, with an even higher prevalence in certain ethnic groups.     Inheritance:   Humans have over 20,000 genes that instruct our bodies how to function.  We have two copies of each gene because we inherit one from our mother and one from our father.  In most cardiac cases with a genetic component, the condition is inherited in an autosomal dominant (AD) pattern.  This means that in order to have the condition, a person needs to inherit a mutation on one copy of a particular gene.  This mutation or pathogenic variant dominates the \"normal\" working copy of the gene.  When an affected individual has children, they can either pass on the \"normal\" copy of the gene or the mutation.  Therefore, children have a 50% chance of inheriting the mutation.  Other family members also have an increased risk but the specific risk depends on the degree of relationship.  Additional inheritance patterns can occur within families and may alter the risk of " recurrence.     Testing Options:   Genetic testing is available to assess a panel of genes known to cause this condition.  This test reads through the DNA (sequencing) of these genes to look for spelling mistakes or mutations that could cause the condition.      There are three types of results you could receive from this test.     -Positive result (mutation/pathogenic variant identified) - confirms diagnosis and provides an answer to why this happened.  In addition, identifying a mutation allows family members to have testing to determine their risk.     -Negative result (mutation not identified) - no genetic changes were identified.  This does not rule out a genetic cause for the condition because not all genetic causes for this condition are known.    -Variant of uncertain significance (VUS) - a genetic change was identified, but there is not enough information to determine whether it is disease-causing or normal human genetic variation.     Although genetic testing may identify a mutation, it cannot provide information about the severity of symptoms or the progression of disease.  We cannot predict age of onset or severity of symptoms due to reduced penetrance and variable expressivity.    Logistics:   Genetic test involves test a sample of DNA, thru blood, saliva, or cheek cells.  The sample will be sent to a laboratory to extract the DNA and sequence the genes for mutations.  The laboratory will work with your insurance company to determine the out of pocket (OOP) cost and will notify you if the OOP cost is greater than $100.  When testing is initiated, results take about 2-4 weeks to return.     Genetic Information and Nondiscrimination Act:  The Genetic Information and Nondiscrimination Act of 2008 ([place]) is a federal law that protects individuals from genetic discrimination in health insurance and employment. Genetic discrimination is defined as the misuse of genetic information. This law does not address  potential discrimination regarding life insurance or disability insurance.      This is especially relevant for at risk individuals who are considering presymptomatic testing.    Screening Recommendations:  Recommend that first degree relatives, including parents, siblings and children, be screened regularly for cholesterol levels starting in childhood.    Resources:  The FH Foundation - thefhfoundation.org    General   American Heart Association - americanheart.org  Genetics Home Reference - ghr.[facility].nih.gov  Genetic Information and Nondiscrimination Act - [facility] Healthnahelp.org    Contact Information:  Keren Low MS  Licensed Genetic Counselor  [facility]  [facility]    Office:  [phone]  Appointments:  [phone]  Fax: [phone]  Email: [email or web address]

## 2021-06-09 NOTE — PROGRESS NOTES
Here is a copy of the progress note from your recent genetic counseling visit to the [facility] on Date: 2021.    PROGRESS NOTE:Roberto was referred by Dr. Mar for genetic counseling due to his history of high cholesterol and the suspicion of familial hypercholesterolemia (FH).  I had the opportunity to talk with Roberto today to discuss the genetic component of FH and testing options available to him.     MEDICAL HISTORY: Roberto reports that he was diagnosed with high cholesterol since 20 years of age.  He recalls that his cholesterol levels have been over 200 for over a decade.  He has tried medications off and on but recent statins were causing significant muscle cramps.      Most recent lipids (21 showed total cholesterol at 245, HDL at 48, LDL at 167, and triglycerides at 157.    FAMILY HISTORY:A detailed family history was obtained today and was significant for the following cardiac history:      Roberto's father is 67 years of age.  He has a history of hypertension and coronary artery disease (CAD).  He had 2 stents placed.    A paternal uncle had a heart attack in his 50's with  Multiple MI's since.  He had stents placed.    Another uncle is known to have high cholesterol.      Two paternal aunts are in good health and two other uncles  after being hit by a car in two separate accidents.    Paternal grandfather  in his 50's.  He had polio but cause of death is unclear.      Paternal grandmother  in her 80's from a heart attack but she was known to have CAD and had a heart attack and bypass surgery in her 60's.    Roberto's mother has cholesterol levels in the 300's but has no known CAD.  There are no heart issues known in her family.  There is no additional history of high cholesterol, CAD, heart attacks, fainting, sudden cardiac death, genetic conditions, or birth defects. (Scanned pedigree may be under media tab)    DISCUSSION: High cholesterol can be  caused by both genetic and non-genetic factors.  There are lots of well known diet and lifestyle choices that are known to increase risk for high cholesterol.  However, in some families, there is an underlying genetic predisposition for high cholesterol.  Familial hypercholesterolemia (FH) is one such genetic condition.  FH is characterized by abnormally high concentrations of low density lipoprotein cholesterol (LDL-C) in the blood since birth, which predisposes affected individuals to premature coronary heart disease (CHD), stroke, and death. Individuals with FH have a significantly increased risk for CHD, estimated to be 20 times greater than that of the general population. If untreated, men with FH have a 50% risk and women with FH have a 30% risk of having a cardiovascular event by ages 50 and 60 years, respectively. The prevalence of FH is estimated to be around 1:250  individuals in the general population, with an even higher prevalence in certain ethnic groups.     Heterozygous FH (HeFH) is inherited in an autosomal dominant manner, meaning that only one mutation is needed to cause disease. Almost all affected individuals inherit the familial mutation from an affected parent. In addition, all children of an individual with HeFH have a 50% chance of inheriting the mutation. Homozygous FH (HoFH) is rare (prevalence 1:1,000,000) and occurs when an individual inherits two mutations. Therefore, if both parents have HeFH, each of their children would have a 25% risk of inheriting HoFH and a 50% risk of inheriting HeFH.    A diagnosis of FH is typically made on a clinical basis. There are several sets of validated diagnostic criteria available, including the [place] MEDPED Program, the [place] FH Registry, and the [place] [facility] Network. Based on these models, Roberto falls into the possible FH category.  However, we have limited information about family members cholesterol levels or medical details.       Genetic testing is also available to confirm the diagnosis. Currently 4 genes have been found to be related to FH. Genetic testing currently identifies mutations in greater than 90% of cases meeting clinical criteria. Reviewed capabilities, limitations, and logistics of testing.  DNA sample via saliva or blood is collected and sent to testing lab for evaluation of selected genes. The results could directly impact care and treatment.      Explained three possible outcomes of genetic testing including: positive identification of a mutation, no mutation identified, and identification of a variant of unknown significance (VUS). If a mutation is identified, presymptomatic testing would be available to at risk family members. If no mutation is identified, it does not rule out the possibility of a genetic component to this disease. Family members could still be at risk for developing the same condition. If a VUS is identified, it is unclear if the mutation is disease causing or just a normal variation. It may take time and possibly additional testing to determine the meaning of a VUS result.     Test results take approximately 2-4 weeks on average. Discussed cost of testing through commercial labs. Explained that the lab will work with insurance to determine coverage and contact patient if out of pocket costs are expected to exceed $100.     Discussed pros and cons of genetic testing. Explained that results could determine the cause for FH. If a mutation is identified, presymptomatic testing is available to all at risk relatives. Reviewed possible issues associated with presymptomatic testing including genetic discrimination, current laws to prevent discrimination (ie. [place]), insurance issues, and emotional and psychosocial outcomes of testing.     Due to the familial risk, we also recommend that at risk family members undergo lipid or molecular analysis of cholesterol levels.     All questions answered at this time.      PLAN: Roberto elected to proceed with genetic testing but due to time constraints he will have kit sent to his home.  Requisition and consent forms were completed and signed.  DNA will be collected via saliva sample and sent to [facility] laboratory. I will contact patient when results are available.    TOTAL TIME SPENT IN COUNSELIN minutes    Keren Low MS, [facility]  Licensed, Certified Genetic Counselor  [facility]

## 2021-06-18 ENCOUNTER — MYC MEDICAL ADVICE (OUTPATIENT)
Dept: CARDIOLOGY | Facility: CLINIC | Age: 36
End: 2021-06-18

## 2021-07-05 ENCOUNTER — DOCUMENTATION ONLY (OUTPATIENT)
Dept: CARDIOLOGY | Facility: CLINIC | Age: 36
End: 2021-07-05

## 2021-07-05 NOTE — LETTER
July 5, 2021       TO: Yahir Phillips  [address]  [place] [address]       Dear Yahir Phillips,    We are reviewing our overdue orders and see that you are due for a visit with your cardiologist to review your test results.     Please call our clinic at [phone] to schedule your appointment as soon as possible.    Thank you,    [facility]REW [facility]

## 2021-07-06 ENCOUNTER — MYC MEDICAL ADVICE (OUTPATIENT)
Dept: CARDIOLOGY | Facility: CLINIC | Age: 36
End: 2021-07-06

## 2021-07-07 NOTE — TELEPHONE ENCOUNTER
My Chart message from patient:  Hi,   I am trying to schedule my appointment follow up, but I am never able to get a hold of anyone on the phone when I call.  Can I schedule the virtual follow up visit on [facility]?     Thanks   Roberto     Reply to patient:    Yoli, Mr. Phillips,    Our scheduling team opens from 8:00 to 4:30 M-F. They are working hard to answer calls within 2-5 minutes. All calls go into a que system, so there can be a wait some days as they work through the calls. Post-holiday days are especially busy.    We are not currently using the My Chart scheduling system for patient to do their own appointments.     Please keep trying and we will do our best to connect and set up your visit.    Team 2 RNs  [phone]

## 2021-07-22 ENCOUNTER — TELEPHONE (OUTPATIENT)
Dept: CARDIOLOGY | Facility: CLINIC | Age: 36
End: 2021-07-22

## 2021-07-22 NOTE — TELEPHONE ENCOUNTER
Roberto calling in regards to his genetic testing kit.  There are no instructions included and patient is unsure what he needs to do.    Please give him a call to discuss.

## 2021-07-26 ENCOUNTER — TRANSFERRED RECORDS (OUTPATIENT)
Dept: HEALTH INFORMATION MANAGEMENT | Facility: CLINIC | Age: 36
End: 2021-07-26

## 2021-09-16 ENCOUNTER — TRANSFERRED RECORDS (OUTPATIENT)
Dept: HEALTH INFORMATION MANAGEMENT | Facility: CLINIC | Age: 36
End: 2021-09-16

## 2021-09-29 ENCOUNTER — TELEPHONE (OUTPATIENT)
Dept: CARDIOLOGY | Facility: CLINIC | Age: 36
End: 2021-09-29

## 2021-09-29 NOTE — TELEPHONE ENCOUNTER
Spoke with Yahir today to review results of genetic testing. He underwent genetic testing on September 9, 2021 due to his diagnosis of high cholesterol and the suspicion of familial hypercholesterolemia (FH).     Genetic testing for the FH panel thru [facility]  revealed that Yahir does NOT carry a mutation in the 4 genes analyzed. It is predicted that this panel will identify mutations in 30% of probable FH cases and close to 90% of definitive FH cases.     Therefore, this negative result does not rule out a genetic basis for pts high cholesterol, but eliminates the option of presymptomatic testing in at risk family members, at this time.    However, since this condition could still be genetic, clinical screening is recommended in all first degree relatives (children, siblings, parents).  Clinical screening should include lipid levels to assess their current status.     Explained that with continued research and genetic knowledge the detection rate for identifying mutations may increase over time. Therefore, it is worth touching base in the years to come to learn about new testing options.     A summary letter and copy of the results will be sent to patient. All questions answered at this time.     Keren Lwo MS, [facility]  Licensed, Certified Genetic Counselor  [facility]  [facility] [place]

## 2021-10-10 ENCOUNTER — HEALTH MAINTENANCE LETTER (OUTPATIENT)
Age: 36
End: 2021-10-10

## 2021-10-25 ENCOUNTER — MYC MEDICAL ADVICE (OUTPATIENT)
Dept: FAMILY MEDICINE | Facility: CLINIC | Age: 36
End: 2021-10-25

## 2021-10-27 ENCOUNTER — VIRTUAL VISIT (OUTPATIENT)
Dept: FAMILY MEDICINE | Facility: CLINIC | Age: 36
End: 2021-10-27
Payer: COMMERCIAL

## 2021-10-27 DIAGNOSIS — Z86.19 HISTORY OF HELICOBACTER PYLORI INFECTION: Primary | ICD-10-CM

## 2021-10-27 PROCEDURE — 99213 OFFICE O/P EST LOW 20 MIN: CPT | Mod: 95 | Performed by: INTERNAL MEDICINE

## 2021-10-27 NOTE — PROGRESS NOTES
Roberto is a 35 year old who is being evaluated via a billable telephone visit.      What phone number would you like to be contacted at? [phone]  How would you like to obtain your AVS? Mail a copy    Assessment & Plan     History of Helicobacter pylori infection  Patient would like repeat testing, ordered.   Treated with ab in 2019  - Helicobacter pylori Antigen Stool      Return if symptoms worsen or fail to improve.    Dominga Villalobos DO  [facility] [place] RENNY Mejia is a 35 year old who presents for the following health issues    HPI     Roberto presents virtually via telephone for below:    He would like an order for hpylori. I did review with him that, per records, he already tested negative for this in July. He states concerns about how the particular lab he went to handled the specimen and would like our lab to do repeat testing. He has worked with a GI on his chronic loose stools/bloating with EGD/Cscope and was told most like IBS. However he had hpylori in 2019 and feels like these symptoms are similar to when he had that and wants to be sure it is negative. He denies currently taking any antiacids that would affect testing.  States after antibiotic treatment in 2019 felt better.    He also relays he saw cardio regarding lipids. He states they recommended repeating in 6 months (was seen in September, cardio future orders seen) and following up but he desires to do testing sooner. States he has already put in 6 months of lifestyle changes. He plans to do the labs when he picks up hpylori test. He is aware lab appt will be needed.    Review of Systems   Constitutional, HEENT, cardiovascular, pulmonary, gi and gu systems are negative, except as otherwise noted.      Objective           Vitals:  No vitals were obtained today due to virtual visit.    Physical Exam   GEN: No acute distress  RESP: No audible increased work of breathing. Patient speaking in full sentences without  distress.  PSYCH: pleasant  Exam otherwise limited due to virtual platform                Phone call duration: 10 minutes

## 2021-10-28 ENCOUNTER — DOCUMENTATION ONLY (OUTPATIENT)
Dept: LAB | Facility: CLINIC | Age: 36
End: 2021-10-28

## 2021-10-28 ENCOUNTER — LAB (OUTPATIENT)
Dept: LAB | Facility: CLINIC | Age: 36
End: 2021-10-28
Payer: COMMERCIAL

## 2021-10-28 DIAGNOSIS — Z86.19 HISTORY OF HELICOBACTER PYLORI INFECTION: ICD-10-CM

## 2021-10-28 DIAGNOSIS — E78.00 PURE HYPERCHOLESTEROLEMIA: ICD-10-CM

## 2021-10-28 PROCEDURE — 80053 COMPREHEN METABOLIC PANEL: CPT

## 2021-10-28 PROCEDURE — 87338 HPYLORI STOOL AG IA: CPT

## 2021-10-28 PROCEDURE — 80061 LIPID PANEL: CPT

## 2021-10-28 PROCEDURE — 36415 COLL VENOUS BLD VENIPUNCTURE: CPT

## 2021-10-29 LAB
ALBUMIN SERPL-MCNC: 3.9 G/DL (ref 3.4–5)
ALP SERPL-CCNC: 75 U/L (ref 40–150)
ALT SERPL W P-5'-P-CCNC: 58 U/L (ref 0–70)
ANION GAP SERPL CALCULATED.3IONS-SCNC: 3 MMOL/L (ref 3–14)
AST SERPL W P-5'-P-CCNC: 35 U/L (ref 0–45)
BILIRUB SERPL-MCNC: 0.9 MG/DL (ref 0.2–1.3)
BUN SERPL-MCNC: 12 MG/DL (ref 7–30)
CALCIUM SERPL-MCNC: 9 MG/DL (ref 8.5–10.1)
CHLORIDE BLD-SCNC: 108 MMOL/L (ref 94–109)
CHOLEST SERPL-MCNC: 213 MG/DL
CO2 SERPL-SCNC: 26 MMOL/L (ref 20–32)
CREAT SERPL-MCNC: 0.9 MG/DL (ref 0.66–1.25)
FASTING STATUS PATIENT QL REPORTED: YES
GFR SERPL CREATININE-BSD FRML MDRD: >90 ML/MIN/1.73M2
GLUCOSE BLD-MCNC: 84 MG/DL (ref 70–99)
H PYLORI AG STL QL IA: NEGATIVE
HDLC SERPL-MCNC: 54 MG/DL
LDLC SERPL CALC-MCNC: 144 MG/DL
NONHDLC SERPL-MCNC: 159 MG/DL
POTASSIUM BLD-SCNC: 4.1 MMOL/L (ref 3.4–5.3)
PROT SERPL-MCNC: 7.7 G/DL (ref 6.8–8.8)
SODIUM SERPL-SCNC: 137 MMOL/L (ref 133–144)
TRIGL SERPL-MCNC: 76 MG/DL

## 2021-11-01 ENCOUNTER — TELEPHONE (OUTPATIENT)
Dept: CARDIOLOGY | Facility: CLINIC | Age: 36
End: 2021-11-01

## 2021-11-01 NOTE — TELEPHONE ENCOUNTER
Lipids and bmp due in March drawn with PCP lab work on 10/27/2021. Patient is intolerant to statins and wanted to try diet and exercise to manage LDL.    Lipids:  Component      Latest Ref Rng & Units 10/28/2021   Cholesterol      <200 mg/dL 213 (H)   Triglycerides      <150 mg/dL 76   HDL Cholesterol      >=40 mg/dL 54   LDL Cholesterol Calculated      <=100 mg/dL 144 (H)   Non HDL Cholesterol      <130 mg/dL 159 (H)   Patient Fasting > 8hrs?       Yes     Per Dr. Mar's note 9/9/2021: could apply for PCSK9 inhibitor and await the results of his genetic testing.    Genetic testing 9/29/2021 with Keren Low: Yahir does NOT carry a mutation in the 4 genes analyzed      Will message Dr. Mar to review

## 2021-11-01 NOTE — TELEPHONE ENCOUNTER
My Chart results release message.   Dr. Mar's reply - Continue lifestyle modification .  Follow-up with PCP.  Diet and exercise     Thank you  Team 2 Nurses.

## 2022-05-22 ENCOUNTER — HEALTH MAINTENANCE LETTER (OUTPATIENT)
Age: 37
End: 2022-05-22

## 2022-09-18 ENCOUNTER — HEALTH MAINTENANCE LETTER (OUTPATIENT)
Age: 37
End: 2022-09-18

## 2023-06-04 ENCOUNTER — HEALTH MAINTENANCE LETTER (OUTPATIENT)
Age: 38
End: 2023-06-04

## 2024-04-09 DIAGNOSIS — M25.561 RIGHT KNEE PAIN: Primary | ICD-10-CM

## 2024-04-10 ENCOUNTER — ANCILLARY PROCEDURE (OUTPATIENT)
Dept: GENERAL RADIOLOGY | Facility: CLINIC | Age: 39
End: 2024-04-10
Attending: FAMILY MEDICINE
Payer: COMMERCIAL

## 2024-04-10 ENCOUNTER — OFFICE VISIT (OUTPATIENT)
Dept: ORTHOPEDICS | Facility: CLINIC | Age: 39
End: 2024-04-10
Payer: COMMERCIAL

## 2024-04-10 DIAGNOSIS — M25.561 ACUTE PAIN OF RIGHT KNEE: Primary | ICD-10-CM

## 2024-04-10 DIAGNOSIS — M25.561 RIGHT KNEE PAIN: ICD-10-CM

## 2024-04-10 PROCEDURE — 99203 OFFICE O/P NEW LOW 30 MIN: CPT | Performed by: FAMILY MEDICINE

## 2024-04-10 PROCEDURE — 73564 X-RAY EXAM KNEE 4 OR MORE: CPT | Mod: RT | Performed by: RADIOLOGY

## 2024-04-10 SDOH — HEALTH STABILITY: PHYSICAL HEALTH: ON AVERAGE, HOW MANY MINUTES DO YOU ENGAGE IN EXERCISE AT THIS LEVEL?: 90 MIN

## 2024-04-10 SDOH — HEALTH STABILITY: PHYSICAL HEALTH: ON AVERAGE, HOW MANY DAYS PER WEEK DO YOU ENGAGE IN MODERATE TO STRENUOUS EXERCISE (LIKE A BRISK WALK)?: 4 DAYS

## 2024-04-10 NOTE — LETTER
4/10/2024         RE: Yahir Phillips  [address] [address]        Dear Colleague,    Thank you for referring your patient, Yahir Phillips, to the [facility] [place]. Please see a copy of my visit note below.      [facility]  SPORTS MEDICINE CLINIC VISIT     Apr 10, 2024        ASSESSMENT & PLAN    38-year-old with persistent right medial knee pain that seems consistent with meniscal injury with no mechanical symptoms    Reviewed imaging and assessment with patient in detail  I recommended initial course of treatment with home exercises, bracing, course of anti-inflammatory as well as potential physical therapy.  He will contact us if he would like to pursue formal PT.  If his symptoms persist or worsen would consider MRI of the knee  As symptoms improved, he is free to resume activities as tolerated.    Fer Justice MD  [facility] SP[facility]IC[facility]    -----  Chief Complaint   Patient presents with     Consult For     Right knee pain       SUBJECTIVE  Yahir Phillips is a/an 38 year old male who is seen as a self referral for evaluation of  right knee pain. Played hockey on Sunday and Tuesday.    The patient is seen by themselves.  The patient is Right handed    Onset: 2.5 week(s) ago. Patient describes injury as playing hockey and fell and leg twisted.   Location of Pain: right knee- medial aspect originally and now it's medial and lateral  Worsened by: squatting, evening  Better with: Nothing  Treatments tried: ice and Tylenol  Associated symptoms: swelling, stiff after squatting    Orthopedic/Surgical history: NO  Social History/Occupation:       REVIEW OF SYSTEMS:  Do you have fever, chills, weight loss? No  Do you have any vision problems? No  Do you have any chest pain or edema? No  Do you have any shortness of breath or wheezing?  No  Do you have stomach problems? No  Do you have any numbness or focal  weakness? No  Do you have diabetes? No  Do you have problems with bleeding or clotting? No  Do you have an rashes or other skin lesions? No    OBJECTIVE:  There were no vitals taken for this visit.     Patient is alert, No acute distress, pleasant and conversational.    Gait: nonantalgic. Normal heel toe gait.    right knee:   Skin intact. No erythema or ecchymosis.  No effusion or soft tissue swelling.    AROM: Zero to approximately 135  without restriction or reported pain.    Palpation: No medial or lateral facet joint tenderness.  TTP of the posterior medial joint line    Special Tests:  Negative bounce test, negative forced flexion and positive Alexis's.  No ligamentous laxity or pain with valgus or varus stress.  Negative Lachman's, Anterior Drawer and Posterior Drawer     Full Isometric quad strength, extensor mechanism in place     Neurovascularly intact in the lower extremity    Hip and Ankle with full AROM and nontender      RADIOLOGY:    4 view xrays of right knee performed and reviewed independently demonstrating no acute fracture or dislocation.  No significant DJD. See EMR for formal radiology report.               Again, thank you for allowing me to participate in the care of your patient.        Sincerely,        Fer Justice MD

## 2024-04-10 NOTE — PROGRESS NOTES
[facility]  SPORTS MEDICINE CLINIC VISIT     Apr 10, 2024        ASSESSMENT & PLAN    38-year-old with persistent right medial knee pain that seems consistent with meniscal injury with no mechanical symptoms    Reviewed imaging and assessment with patient in detail  I recommended initial course of treatment with home exercises, bracing, course of anti-inflammatory as well as potential physical therapy.  He will contact us if he would like to pursue formal PT.  If his symptoms persist or worsen would consider MRI of the knee  As symptoms improved, he is free to resume activities as tolerated.    Fer Justice MD  [facility] SP[facility] MEDICINE [facility] [place]    -----  Chief Complaint   Patient presents with     Consult For     Right knee pain       SUBJECTIVE  Yahir Phillips is a/an 38 year old male who is seen as a self referral for evaluation of  right knee pain. Played hockey on Sunday and Tuesday.    The patient is seen by themselves.  The patient is Right handed    Onset: 2.5 week(s) ago. Patient describes injury as playing hockey and fell and leg twisted.   Location of Pain: right knee- medial aspect originally and now it's medial and lateral  Worsened by: squatting, evening  Better with: Nothing  Treatments tried: ice and Tylenol  Associated symptoms: swelling, stiff after squatting    Orthopedic/Surgical history: NO  Social History/Occupation:       REVIEW OF SYSTEMS:    Do you have fever, chills, weight loss? No    Do you have any vision problems? No    Do you have any chest pain or edema? No    Do you have any shortness of breath or wheezing?  No    Do you have stomach problems? No    Do you have any numbness or focal weakness? No    Do you have diabetes? No    Do you have problems with bleeding or clotting? No    Do you have an rashes or other skin lesions? No    OBJECTIVE:  There were no vitals taken for this visit.     Patient is alert, No acute distress, pleasant  and conversational.    Gait: nonantalgic. Normal heel toe gait.    right knee:   Skin intact. No erythema or ecchymosis.  No effusion or soft tissue swelling.    AROM: Zero to approximately 135  without restriction or reported pain.    Palpation: No medial or lateral facet joint tenderness.  TTP of the posterior medial joint line    Special Tests:  Negative bounce test, negative forced flexion and positive Alexis's.  No ligamentous laxity or pain with valgus or varus stress.  Negative Lachman's, Anterior Drawer and Posterior Drawer     Full Isometric quad strength, extensor mechanism in place     Neurovascularly intact in the lower extremity    Hip and Ankle with full AROM and nontender      RADIOLOGY:    4 view xrays of right knee performed and reviewed independently demonstrating no acute fracture or dislocation.  No significant DJD. See EMR for formal radiology report.

## 2024-04-10 NOTE — PATIENT INSTRUCTIONS
-Perform home exercises to the extent that you can do so comfortably and advance as tolerated  Contact our office if you would like a formal referral to physical therapy  --Take ibuprofen(advil, motrin) 600mg three times daily with food for two weeks    OR  Naproxen(aleve) two tabs twice daily with food for two weeks  Alternatively, you could try topical diclofenac.  (Voltaren gel) on the knee 2-3 times daily for 2 weeks  -May consider use of knee sleeve as needed for comfort  -If no improvement after another month or if you have repeated recurrences of pain or worsening symptoms we may consider an MRI for your knee.  Contact our office for this indication.    -Thank you again for coming in today and for your patience!      KEY FACTS ABOUT MENISCAL TEARS  Click to enlarge A meniscal tear can be caused by a sudden activity that twists or tears a ligament, such as a forced twisting motion of your knee, or a fall or impact during football, basketball, or soccer.    Straightening your knee further than it can normally be straightened (hyperextension) or trouble bending, like when you land from a jump, is also another cause of a meniscal tear.  A loud, painful pop at the time of the injury is very common. It causes an immediate swelling and pain around the knee as if loose or unstable.    WHAT IS MENISCAL TEAR?  A meniscal tear is tear to the meniscal ligament that keeps the knee from bending inward. The meniscal, along with other ligaments, keeps your knee and leg bones in place when you walk or run. When a ligament is injured, it can be stretched, partially torn, or completely torn. Complete tears make the knee joint very loose and unstable.    A ligament injury is also called a sprain.    HOW IS MENISCAL TEAR DIAGNOSED AND TREATED?  A physician will examine the knee, ligaments, and tissue to make an initial assessment. Typically, he or she will recommend a combination of X-rays, CT scans, or MRIs to determine the  exact extent of the damage.     You will need to change or stop doing the activities that cause pain until the ligament has healed.    If you have swelling in your joint, your healthcare team may need to remove fluid from your knee with a needle and syringe.  Your care team may wrap an elastic bandage around your knee to keep the swelling from getting worse. You may need to keep your knee in a knee immobilizer and use crutches to protect your knee while you heal.  For complete tears, you and your healthcare team will decide if you should have intense rehabilitation therapy or if you should have surgery followed by rehab. A torn meniscal cannot be sewn back together. The ligament must be surgically reconstructed by taking ligaments or tendons from another part of your leg and connecting them to the thighbone and shinbone.  If you have a completely torn mensical and it is not repaired with surgery, the effects will be life-long. Your knee may feel loose and feel like it will give way when you are running and making quick turns. Rehabilitation exercises and a special brace will help improve these symptoms. If you can do your normal activities without pain and are willing to give up activities that put extra stress on your knee, you may not need surgery.    You may consider having reconstructive meniscal tear surgery if:  Your knee is unstable and gives out during routine or athletic activity.  You are a  and your knee could be unstable and give out during your sport (for example, basketball, football, or soccer).  You are not willing to give up sports that involve pivoting and cutting, like soccer and basketball.  You want to prevent further injury to your knee. An unstable knee may lead to more injuries and arthritis.    HOW CAN I MANAGE MENISCAL TEAR?  Follow your healthcare team's instructions, including any recommended physical therapy or exercises.  To keep swelling down and help relieve pain  for the first few days after the injury:  Put an ice pack, gel pack, or package of frozen vegetables wrapped in a cloth on the injured area every 3 to 4 hours for up to 20 minutes at a time.  Keep your knee up on a pillow when you sit or lie down.  Take nonprescription pain medicine, such as acetaminophen, ibuprofen, or naproxen. Read the label and take as directed. Unless recommended by your healthcare team, you should not take this medicine for more than 10 days.    Knee Exercises (Meniscal tear)    You may do the first 7 exercises right away. You may do the rest of the exercises when the pain in your knee has decreased.    Passive knee extension: Do this exercise if you are unable to extend your knee fully. While lying on your back, place a rolled-up towel under the heel of your injured leg so the heel is about 6 inches off the ground. Relax your leg muscles and let gravity slowly straighten your knee. Try to hold this position for 2 minutes. Repeat 3 times. You may feel some discomfort while doing this exercise. Do the exercise several times a day.  This exercise can also be done while sitting in a chair with your heel on another chair or stool.    Heel slide: Sit on a firm surface with your legs straight in front of you. Slowly slide the heel of the foot on your injured side toward your buttock by pulling your knee toward your chest as you slide the heel. Return to the starting position. Do 2 sets of 15.  Standing calf stretch: Stand facing a wall with your hands on the wall at about eye level. Keep your injured leg back with your heel on the floor. Keep the other leg forward with the knee bent. Turn your back foot slightly inward (as if you were pigeon-toed). Slowly lean into the wall until you feel a stretch in the back of your calf. Hold the stretch for 15 to 30 seconds. Return to the starting position. Repeat 3 times. Do this exercise several times each day.    Hamstring stretch on wall: Lie on your back  with your buttocks close to a doorway. Stretch your uninjured leg straight out in front of you on the floor through the doorway. Raise your injured leg and rest it against the wall next to the door frame. Keep your leg as straight as possible. You should feel a stretch in the back of your thigh. Hold this position for 15 to 30 seconds. Repeat 3 times.  Straight leg raise: Lie on your back with your legs straight out in front of you. Bend the knee on your uninjured side and place the foot flat on the floor. Tighten the thigh muscle on your injured side and lift your leg about 8 inches off the floor. Keep your leg straight and your thigh muscle tight. Slowly lower your leg back down to the floor. Do 2 sets of 15.    Prone hip extension: Lie on your stomach with your legs straight out behind you. Fold your arms under your head and rest your head on your arms. Draw your belly button in towards your spine and tighten your abdominal muscles. Tighten the buttocks and thigh muscles of the leg on your injured side and lift the leg off the floor about 8 inches. Keep your leg straight. Hold for 5 seconds. Then lower your leg and relax. Do 2 sets of 15.  Clam exercise: Lie on your uninjured side with your hips and knees bent and feet together. Slowly raise your top leg toward the ceiling while keeping your heels touching each other. Hold for 2 seconds and lower slowly. Do 2 sets of 15 repetitions.    Wall squat with a ball: Stand with your back, shoulders, and head against a wall. Look straight ahead. Keep your shoulders relaxed and your feet 3 feet (90 centimeters) from the wall and shoulder's width apart. Place a soccer or basketball-sized ball behind your back. Keeping your back against the wall, slowly squat down to a 45-degree angle. Your thighs will not yet be parallel to the floor. Hold this position for 10 seconds and then slowly slide back up the wall. Repeat 10 times. Build up to 2 sets of 15.  Step-up: Stand with the  foot of your injured leg on a support 3 to 5 inches (8 to 13 centimeters) high --like a small step or block of wood. Keep your other foot flat on the floor. Shift your weight onto the injured leg on the support. Straighten your injured leg as the other leg comes off the floor. Return to the starting position by bending your injured leg and slowly lowering your uninjured leg back to the floor. Do 2 sets of 15.    Knee stabilization: Wrap a piece of elastic tubing around the ankle of your uninjured leg. Tie a knot in the other end of the tubing and close it in a door at about ankle height.  Stand facing the door on the leg without tubing (your injured leg) and bend your knee slightly, keeping your thigh muscles tight. Stay in this position while you move the leg with the tubing (the uninjured leg) straight back behind you. Do 2 sets of 15.  Turn 90 degrees so the leg without tubing is closest to the door. Move the leg with tubing away from your body. Do 2 sets of 15.  Turn 90 degrees again so your back is to the door. Move the leg with tubing straight out in front of you. Do 2 sets of 15.  Turn your body 90 degrees again so the leg with tubing is closest to the door. Move the leg with tubing across your body. Do 2 sets of 15.  Hold onto a chair if you need help balancing. This exercise can be made more challenging by standing on a firm pillow or foam mat while you move the leg with tubing.    Resisted terminal knee extension: Make a loop with a piece of elastic tubing by tying a knot in both ends. Close the knot in a door at knee height. Step into the loop with your injured leg so the tubing is around the back of your knee. Lift the other foot off the ground and hold onto a chair for balance, if needed. Bend the knee with tubing about 45 degrees. Slowly straighten your leg, keeping your thigh muscle tight as you do this. Repeat 15 times. Do 2 sets of 15. If you need an easier way to do this, stand on both legs for  better support while you do the exercise.  If you have access to a wobble board, do the following exercises:            Developed by [employer].  Published by [employer].  Copyright  2014 [employer] and/or one of its subsidiaries. All rights reserved.

## 2024-05-13 ENCOUNTER — OFFICE VISIT (OUTPATIENT)
Dept: FAMILY MEDICINE | Facility: CLINIC | Age: 39
End: 2024-05-13
Payer: COMMERCIAL

## 2024-05-13 VITALS
RESPIRATION RATE: 16 BRPM | HEART RATE: 65 BPM | SYSTOLIC BLOOD PRESSURE: 97 MMHG | WEIGHT: 175.1 LBS | OXYGEN SATURATION: 97 % | TEMPERATURE: 97.7 F | HEIGHT: 69 IN | BODY MASS INDEX: 25.93 KG/M2 | DIASTOLIC BLOOD PRESSURE: 59 MMHG

## 2024-05-13 DIAGNOSIS — Z80.42 FAMILY HISTORY OF PROSTATE CANCER IN FATHER: ICD-10-CM

## 2024-05-13 DIAGNOSIS — B35.3 TINEA PEDIS OF BOTH FEET: ICD-10-CM

## 2024-05-13 DIAGNOSIS — Z13.6 CARDIOVASCULAR SCREENING; LDL GOAL LESS THAN 160: ICD-10-CM

## 2024-05-13 DIAGNOSIS — F43.22 ADJUSTMENT DISORDER WITH ANXIOUS MOOD: ICD-10-CM

## 2024-05-13 DIAGNOSIS — Z00.00 ROUTINE GENERAL MEDICAL EXAMINATION AT A HEALTH CARE FACILITY: Primary | ICD-10-CM

## 2024-05-13 LAB
BASOPHILS # BLD AUTO: 0 10E3/UL (ref 0–0.2)
BASOPHILS NFR BLD AUTO: 0 %
EOSINOPHIL # BLD AUTO: 0.2 10E3/UL (ref 0–0.7)
EOSINOPHIL NFR BLD AUTO: 2 %
ERYTHROCYTE [DISTWIDTH] IN BLOOD BY AUTOMATED COUNT: 12.7 % (ref 10–15)
HCT VFR BLD AUTO: 43.9 % (ref 40–53)
HGB BLD-MCNC: 15 G/DL (ref 13.3–17.7)
IMM GRANULOCYTES # BLD: 0 10E3/UL
IMM GRANULOCYTES NFR BLD: 0 %
LYMPHOCYTES # BLD AUTO: 1.8 10E3/UL (ref 0.8–5.3)
LYMPHOCYTES NFR BLD AUTO: 24 %
MCH RBC QN AUTO: 31.6 PG (ref 26.5–33)
MCHC RBC AUTO-ENTMCNC: 34.2 G/DL (ref 31.5–36.5)
MCV RBC AUTO: 93 FL (ref 78–100)
MONOCYTES # BLD AUTO: 0.5 10E3/UL (ref 0–1.3)
MONOCYTES NFR BLD AUTO: 7 %
NEUTROPHILS # BLD AUTO: 5.2 10E3/UL (ref 1.6–8.3)
NEUTROPHILS NFR BLD AUTO: 67 %
PLATELET # BLD AUTO: 226 10E3/UL (ref 150–450)
RBC # BLD AUTO: 4.74 10E6/UL (ref 4.4–5.9)
WBC # BLD AUTO: 7.7 10E3/UL (ref 4–11)

## 2024-05-13 PROCEDURE — 99396 PREV VISIT EST AGE 40-64: CPT | Performed by: PHYSICIAN ASSISTANT

## 2024-05-13 PROCEDURE — 80061 LIPID PANEL: CPT | Performed by: PHYSICIAN ASSISTANT

## 2024-05-13 PROCEDURE — G0103 PSA SCREENING: HCPCS | Performed by: PHYSICIAN ASSISTANT

## 2024-05-13 PROCEDURE — 85025 COMPLETE CBC W/AUTO DIFF WBC: CPT | Performed by: PHYSICIAN ASSISTANT

## 2024-05-13 PROCEDURE — 99213 OFFICE O/P EST LOW 20 MIN: CPT | Mod: 25 | Performed by: PHYSICIAN ASSISTANT

## 2024-05-13 PROCEDURE — 36415 COLL VENOUS BLD VENIPUNCTURE: CPT | Performed by: PHYSICIAN ASSISTANT

## 2024-05-13 PROCEDURE — 80053 COMPREHEN METABOLIC PANEL: CPT | Performed by: PHYSICIAN ASSISTANT

## 2024-05-13 PROCEDURE — 84443 ASSAY THYROID STIM HORMONE: CPT | Performed by: PHYSICIAN ASSISTANT

## 2024-05-13 RX ORDER — KETOCONAZOLE 20 MG/G
CREAM TOPICAL DAILY
Qty: 60 G | Refills: 1 | Status: SHIPPED | OUTPATIENT
Start: 2024-05-13

## 2024-05-13 RX ORDER — ESCITALOPRAM OXALATE 10 MG/1
10 TABLET ORAL DAILY
Qty: 30 TABLET | Refills: 4 | Status: SHIPPED | OUTPATIENT
Start: 2024-05-13 | End: 2024-07-31

## 2024-05-13 SDOH — HEALTH STABILITY: PHYSICAL HEALTH: ON AVERAGE, HOW MANY MINUTES DO YOU ENGAGE IN EXERCISE AT THIS LEVEL?: 60 MIN

## 2024-05-13 SDOH — HEALTH STABILITY: PHYSICAL HEALTH: ON AVERAGE, HOW MANY DAYS PER WEEK DO YOU ENGAGE IN MODERATE TO STRENUOUS EXERCISE (LIKE A BRISK WALK)?: 4 DAYS

## 2024-05-13 ASSESSMENT — SOCIAL DETERMINANTS OF HEALTH (SDOH): HOW OFTEN DO YOU GET TOGETHER WITH FRIENDS OR RELATIVES?: ONCE A WEEK

## 2024-05-13 ASSESSMENT — PAIN SCALES - GENERAL: PAINLEVEL: NO PAIN (0)

## 2024-05-13 NOTE — PROGRESS NOTES
"Preventive Care Visit  [facility]  Dane Disla PA-C, Family Medicine  May 13, 2024      Assessment & Plan     Routine general medical examination at a health care facility    - CBC with platelets and differential; Future  - Comprehensive metabolic panel (BMP + Alb, Alk Phos, ALT, AST, Total. Bili, TP); Future  - TSH with free T4 reflex; Future  - CBC with platelets and differential  - Comprehensive metabolic panel (BMP + Alb, Alk Phos, ALT, AST, Total. Bili, TP)  - TSH with free T4 reflex    Adjustment disorder with anxious mood  Discussed next steps in treatment both med and non med options.  He is very interested in trying med.  Discussed potential benefit and side effects of meds and he is in agreement  - escitalopram (LEXAPRO) 10 MG tablet; Take 1 tablet (10 mg) by mouth daily    Tinea pedis of both feet    - ketoconazole (NIZORAL) 2 % external cream; Apply topically daily    CARDIOVASCULAR SCREENING; LDL GOAL LESS THAN 160    - Lipid panel reflex to direct LDL Non-fasting; Future  - Lipid panel reflex to direct LDL Non-fasting    Family history of prostate cancer in father    - PSA, screen; Future  - PSA, screen            BMI  Estimated body mass index is 25.86 kg/m  as calculated from the following:    Height as of this encounter: 1.753 m (5' 9\").    Weight as of this encounter: 79.4 kg (175 lb 1.6 oz).       Counseling  Appropriate preventive services were discussed with this patient, including applicable screening as appropriate for fall prevention, nutrition, physical activity, Tobacco-use cessation, weight loss and cognition.  Checklist reviewing preventive services available has been given to the patient.  Reviewed patient's diet, addressing concerns and/or questions.           Kisha Mejia is a 38 year old, presenting for the following:  Physical         No data to display                 Health Care Directive  Patient does not have a Health Care Directive or Living " Will:     HPI              5/13/2024   General Health   How would you rate your overall physical health? Good   Feel stress (tense, anxious, or unable to sleep) To some extent   (!) STRESS CONCERN      5/13/2024   Nutrition   Three or more servings of calcium each day? Yes   Diet: Regular (no restrictions)   How many servings of fruit and vegetables per day? (!) 2-3   How many sweetened beverages each day? 0-1         5/13/2024   Exercise   Days per week of moderate/strenous exercise 4 days   Average minutes spent exercising at this level 60 min         5/13/2024   Social Factors   Frequency of gathering with friends or relatives Once a week   Worry food won't last until get money to buy more No   Food not last or not have enough money for food? No   Do you have housing?  Yes   Are you worried about losing your housing? No   Lack of transportation? No   Unable to get utilities (heat,electricity)? No         5/13/2024   Dental   Dentist two times every year? Yes         5/13/2024   TB Screening   Were you born outside of the [place]? No         Today's PHQ-2 Score:       5/13/2024    12:41 AM   PHQ-2 ( 1999 [employer])   Q1: Little interest or pleasure in doing things 0   Q2: Feeling down, depressed or hopeless 0   PHQ-2 Score 0   Q1: Little interest or pleasure in doing things Not at all   Q2: Feeling down, depressed or hopeless Not at all   PHQ-2 Score 0           5/13/2024   Substance Use   Alcohol more than 3/day or more than 7/wk No   Do you use any other substances recreationally? No     Social History     Tobacco Use    Smoking status: Never    Smokeless tobacco: Never   Vaping Use    Vaping status: Never Used   Substance Use Topics    Alcohol use: Yes     Comment: 1 per week    Drug use: Never             5/13/2024   One time HIV Screening   Previous HIV test? I don't know         5/13/2024   STI Screening   New sexual partner(s) since last STI/HIV test? No   ASCVD Risk   The 10-year ASCVD risk score (Vito  "CHARLES, et al., 2019) is: 0.8%    Values used to calculate the score:      Age: 38 years      Sex: Male      Is Non- : No      Diabetic: No      Tobacco smoker: No      Systolic Blood Pressure: 97 mmHg      Is BP treated: No      HDL Cholesterol: 47 mg/dL      Total Cholesterol: 210 mg/dL        5/13/2024   Contraception/Family Planning   Questions about contraception or family planning No        Reviewed and updated as needed this visit by Provider                    BP Readings from Last 3 Encounters:   05/13/24 97/59   05/24/21 116/68   05/20/21 93/52    Wt Readings from Last 3 Encounters:   05/13/24 79.4 kg (175 lb 1.6 oz)   05/04/21 81.6 kg (180 lb)   04/05/21 80.7 kg (178 lb)                      Review of Systems  Constitutional, HEENT, cardiovascular, pulmonary, gi and gu systems are negative, except as otherwise noted.     Objective    Exam  BP 97/59   Pulse 65   Temp 97.7  F (36.5  C) (Temporal)   Resp 16   Ht 1.753 m (5' 9\")   Wt 79.4 kg (175 lb 1.6 oz)   SpO2 97%   BMI 25.86 kg/m     Estimated body mass index is 25.86 kg/m  as calculated from the following:    Height as of this encounter: 1.753 m (5' 9\").    Weight as of this encounter: 79.4 kg (175 lb 1.6 oz).    Physical Exam  GENERAL: alert and no distress  EYES: Eyes grossly normal to inspection  HENT: ear canals and TM's normal, nose and mouth without ulcers or lesions  NECK: no adenopathy, no asymmetry, masses, or scars  RESP: lungs clear to auscultation - no rales, rhonchi or wheezes  CV: regular rate and rhythm, normal S1 S2, no S3 or S4, no murmur, click or rub, no peripheral edema  ABDOMEN: soft, nontender, no hepatosplenomegaly, no masses and bowel sounds normal  MS: no gross musculoskeletal defects noted, no edema  SKIN: no suspicious lesions or rashes  NEURO: Normal strength and tone, mentation intact and speech normal  PSYCH: mentation appears normal, affect normal/bright        Signed Electronically by: Dane" Errol Disla PA-C

## 2024-05-13 NOTE — PATIENT INSTRUCTIONS
"Preventive Care Advice   This is general advice we often give to help people stay healthy. Your care team may have specific advice just for you. Please talk to your care team about your own preventive care needs.  Lifestyle  Exercise at least 150 minutes each week (30 minutes a day, 5 days a week).  Do muscle strengthening activities 2 days a week. These help control your weight and prevent disease.  No smoking.  Wear sunscreen to prevent skin cancer.  Have your home tested for radon every 2 to 5 years. Radon is a colorless, odorless gas that can harm your lungs. To learn more, go to www.health.[facility].mn. and search for \"Radon in Homes.\"  Keep guns unloaded and locked up in a safe place like a safe or gun vault, or, use a gun lock and hide the keys. Always lock away bullets separately. To learn more, visit [employer].mn.gov and search for \"safe gun storage.\"  Nutrition  Eat 5 or more servings of fruits and vegetables each day.  Try wheat bread, brown rice and whole grain pasta (instead of white bread, rice, and pasta).  Get enough calcium and vitamin D. Check the label on foods and aim for 100% of the RDA (recommended daily allowance).  Regular exams  Have a dental exam and cleaning every 6 months.  See your health care team every year to talk about:  Any changes in your health.  Any medicines your care team has prescribed.  Preventive care, family planning, and ways to prevent chronic diseases.  Shots (vaccines)   HPV shots (up to age 26), if you've never had them before.  Hepatitis B shots (up to age 59), if you've never had them before.  COVID-19 shot: Get this shot when it's due.  Flu shot: Get a flu shot every year.  Tetanus shot: Get a tetanus shot every 10 years.  Pneumococcal, hepatitis A, and RSV shots: Ask your care team if you need these based on your risk.  Shingles shot (for age 50 and up).  General health tests  Diabetes screening:  Starting at age 35, Get screened for diabetes at least every 3 years.  If " you are younger than age 35, ask your care team if you should be screened for diabetes.  Cholesterol test: At age 39, start having a cholesterol test every 5 years, or more often if advised.  Bone density scan (DEXA): At age 50, ask your care team if you should have this scan for osteoporosis (brittle bones).  Hepatitis C: Get tested at least once in your life.  Abdominal aortic aneurysm screening: Talk to your doctor about having this screening if you:  Have ever smoked; and  Are biologically male; and  Are between the ages of 65 and 75.  STIs (sexually transmitted infections)  Before age 24: Ask your care team if you should be screened for STIs.  After age 24: Get screened for STIs if you're at risk. You are at risk for STIs (including HIV) if:  You are sexually active with more than one person.  You don't use condoms every time.  You or a partner was diagnosed with a sexually transmitted infection.  If you are at risk for HIV, ask about PrEP medicine to prevent HIV.  Get tested for HIV at least once in your life, whether you are at risk for HIV or not.  Cancer screening tests  Cervical cancer screening: If you have a cervix, begin getting regular cervical cancer screening tests at age 21. Most people who have regular screenings with normal results can stop after age 65. Talk about this with your provider.  Breast cancer scan (mammogram): If you've ever had breasts, begin having regular mammograms starting at age 40. This is a scan to check for breast cancer.  Colon cancer screening: It is important to start screening for colon cancer at age 45.  Have a colonoscopy test every 10 years (or more often if you're at risk) Or, ask your provider about stool tests like a FIT test every year or Cologuard test every 3 years.  To learn more about your testing options, visit: www.[employer]/526128.pdf.  For help making a decision, visit: pascual/ox67928.  Prostate cancer screening test: If you have a prostate and are age 55  to 69, ask your provider if you would benefit from a yearly prostate cancer screening test.  Lung cancer screening: If you are a current or former smoker age 50 to 80, ask your care team if ongoing lung cancer screenings are right for you.  For informational purposes only. Not to replace the advice of your health care provider. Copyright   2023 [employer]. All rights reserved. Clinically reviewed by the [facility] Transitions Program. [employer] 102481 - REV 04/24.    Learning About Stress  What is stress?     Stress is your body's response to a hard situation. Your body can have a physical, emotional, or mental response. Stress is a fact of life for most people, and it affects everyone differently. What causes stress for you may not be stressful for someone else.  A lot of things can cause stress. You may feel stress when you go on a job interview, take a test, or run a race. This kind of short-term stress is normal and even useful. It can help you if you need to work hard or react quickly. For example, stress can help you finish an important job on time.  Long-term stress is caused by ongoing stressful situations or events. Examples of long-term stress include long-term health problems, ongoing problems at work, or conflicts in your family. Long-term stress can harm your health.  How does stress affect your health?  When you are stressed, your body responds as though you are in danger. It makes hormones that speed up your heart, make you breathe faster, and give you a burst of energy. This is called the fight-or-flight stress response. If the stress is over quickly, your body goes back to normal and no harm is done.  But if stress happens too often or lasts too long, it can have bad effects. Long-term stress can make you more likely to get sick, and it can make symptoms of some diseases worse. If you tense up when you are stressed, you may develop neck, shoulder, or low back pain. Stress is  linked to high blood pressure and heart disease.  Stress also harms your emotional health. It can make you hines, tense, or depressed. Your relationships may suffer, and you may not do well at work or school.  What can you do to manage stress?  You can try these things to help manage stress:   Do something active. Exercise or activity can help reduce stress. Walking is a great way to get started. Even everyday activities such as housecleaning or yard work can help.  Try yoga or fadia chi. These techniques combine exercise and meditation. You may need some training at first to learn them.  Do something you enjoy. For example, listen to music or go to a movie. Practice your hobby or do volunteer work.  Meditate. This can help you relax, because you are not worrying about what happened before or what may happen in the future.  Do guided imagery. Imagine yourself in any setting that helps you feel calm. You can use online videos, books, or a teacher to guide you.  Do breathing exercises. For example:  From a standing position, bend forward from the waist with your knees slightly bent. Let your arms dangle close to the floor.  Breathe in slowly and deeply as you return to a standing position. Roll up slowly and lift your head last.  Hold your breath for just a few seconds in the standing position.  Breathe out slowly and bend forward from the waist.  Let your feelings out. Talk, laugh, cry, and express anger when you need to. Talking with supportive friends or family, a counselor, or a dominick leader about your feelings is a healthy way to relieve stress. Avoid discussing your feelings with people who make you feel worse.  Write. It may help to write about things that are bothering you. This helps you find out how much stress you feel and what is causing it. When you know this, you can find better ways to cope.  What can you do to prevent stress?  You might try some of these things to help prevent stress:  Manage your time.  "This helps you find time to do the things you want and need to do.  Get enough sleep. Your body recovers from the stresses of the day while you are sleeping.  Get support. Your family, friends, and community can make a difference in how you experience stress.  Limit your news feed. Avoid or limit time on social media or news that may make you feel stressed.  Do something active. Exercise or activity can help reduce stress. Walking is a great way to get started.  Where can you learn more?  Go to https://www.[employer].net/patiented  Enter N032 in the search box to learn more about \"Learning About Stress.\"  Current as of: October 24, 2023               Content Version: 14.0    3211-2753 [employer].   Care instructions adapted under license by your healthcare professional. If you have questions about a medical condition or this instruction, always ask your healthcare professional. [employer] disclaims any warranty or liability for your use of this information.      "

## 2024-05-14 LAB
ALBUMIN SERPL BCG-MCNC: 4.5 G/DL (ref 3.5–5.2)
ALP SERPL-CCNC: 82 U/L (ref 40–150)
ALT SERPL W P-5'-P-CCNC: 40 U/L (ref 0–70)
ANION GAP SERPL CALCULATED.3IONS-SCNC: 12 MMOL/L (ref 7–15)
AST SERPL W P-5'-P-CCNC: 37 U/L (ref 0–45)
BILIRUB SERPL-MCNC: 0.5 MG/DL
BUN SERPL-MCNC: 16.3 MG/DL (ref 6–20)
CALCIUM SERPL-MCNC: 9.5 MG/DL (ref 8.6–10)
CHLORIDE SERPL-SCNC: 104 MMOL/L (ref 98–107)
CHOLEST SERPL-MCNC: 210 MG/DL
CREAT SERPL-MCNC: 1.18 MG/DL (ref 0.67–1.17)
DEPRECATED HCO3 PLAS-SCNC: 24 MMOL/L (ref 22–29)
EGFRCR SERPLBLD CKD-EPI 2021: 80 ML/MIN/1.73M2
FASTING STATUS PATIENT QL REPORTED: NO
FASTING STATUS PATIENT QL REPORTED: NO
GLUCOSE SERPL-MCNC: 93 MG/DL (ref 70–99)
HDLC SERPL-MCNC: 47 MG/DL
LDLC SERPL CALC-MCNC: 130 MG/DL
NONHDLC SERPL-MCNC: 163 MG/DL
POTASSIUM SERPL-SCNC: 4.4 MMOL/L (ref 3.4–5.3)
PROT SERPL-MCNC: 7.2 G/DL (ref 6.4–8.3)
PSA SERPL DL<=0.01 NG/ML-MCNC: 0.63 NG/ML (ref 0–2.5)
SODIUM SERPL-SCNC: 140 MMOL/L (ref 135–145)
TRIGL SERPL-MCNC: 165 MG/DL
TSH SERPL DL<=0.005 MIU/L-ACNC: 1.99 UIU/ML (ref 0.3–4.2)

## 2024-05-17 NOTE — RESULT ENCOUNTER NOTE
Dear Yahir    Your test results are attached, feel free to contact me via Claro     I just wanted to make sure you saw your recent labs and to see how the first few days is going with the new med.  Overall, labs are stable from the past.    Juan Disla PA-C

## 2025-01-13 ENCOUNTER — TELEPHONE (OUTPATIENT)
Dept: DERMATOLOGY | Facility: CLINIC | Age: 40
End: 2025-01-13
Payer: COMMERCIAL

## 2025-01-13 NOTE — TELEPHONE ENCOUNTER
Online Appointment Request     [facility] Call Center  Phone Message      Reason for Call: Appointment Intake     Patients Requests:  Reason for appointment  I have some questionable moles and spots on my skin I'd like to have checked out.  Preferred Provider  Madison Pedraza  Preferred Visit Type  In-person          Action taken: Other: none; Documenting patient was called; detail message was left for patient to call back.

## 2025-05-08 ENCOUNTER — PATIENT OUTREACH (OUTPATIENT)
Dept: CARE COORDINATION | Facility: CLINIC | Age: 40
End: 2025-05-08
Payer: COMMERCIAL

## 2025-05-19 ENCOUNTER — OFFICE VISIT (OUTPATIENT)
Dept: DERMATOLOGY | Facility: CLINIC | Age: 40
End: 2025-05-19
Payer: COMMERCIAL

## 2025-05-19 DIAGNOSIS — L30.4 INTERTRIGO: ICD-10-CM

## 2025-05-19 DIAGNOSIS — L24.89 IRRITANT CONTACT DERMATITIS DUE TO OTHER AGENTS: Primary | ICD-10-CM

## 2025-05-19 DIAGNOSIS — L82.0 INFLAMED SEBORRHEIC KERATOSIS: ICD-10-CM

## 2025-05-19 NOTE — PATIENT INSTRUCTIONS
The rash in your groin is likely intertrigo  - moisture and friction can exacerbate this  - reach out if you feel the irritation is significant enough to desire a prescription topical     The sensitivity of the penis triggered by friction may be due to excessive moisture of the glans    CRYOTHERAPY    What is it?  Use of a very cold liquid, such as liquid nitrogen, to freeze and destroy abnormal skin cells that need to be removed    What should I expect?  Tenderness and redness  A small blister that might grow and fill with dark purple blood.  More than one treatment may be needed if the lesions do not go away.    How do I care for the treated area?  Gently wash the area with your hands when bathing.  Use a thin layer of VaselineÆ to help with healing.   The area should heal within 7-10 days.  Do not use an antibiotic or NeosporinÆ ointment.   You may take acetaminophen (TylenolÆ) for pain.     Call your Doctor if you have:  Severe pain  Signs of infection (warmth, redness, cloudy yellow drainage, and or a bad smell)  Questions or concerns

## 2025-05-19 NOTE — LETTER
5/19/2025      Yahir Phillips  [address] [place] [place] [address]      Dear Colleague,    Thank you for referring your patient, Yahir Phillips, to the [facility]. Please see a copy of my visit note below.    [facility] Dermatology Note    Encounter Date: May 19, 2025    Dermatology Problem List:    ______________________________________    Impression/Plan:  Roberto was seen today for derm problem.    Diagnoses and all orders for this visit:    Irritant contact dermatitis due to other agents  - irritation on glans of penis, only happens after sex or masturbation  - discussed likely friction related  - he states this has never happened before and wants to know what changed  - may be excess moisture (uncircumcised) leading to increased irritation when friction applied.   - rec drying glans regularly and after showering, make sure adequate lubrication used during sex and masturbation    Intertrigo  - rec moisture and friction control  - discussed prescription Rx, declines for now, will reach out to me if feels it is necessary    Inflamed seborrheic keratosis  -     DESTRUCT BENIGN LESION, UP TO 14    Cryotherapy procedure note: After verbal consent and discussion of risks and benefits including but no limited to dyspigmentation/scar, blister, and pain, 5 ISKs on face and back was(were) treated with 1-2mm freeze border for 2 cycles with liquid nitrogen. Post cryotherapy instructions were provided.       Follow-up PRN.       Staff Involved:  Staff Only    Pal Dave MD   of Dermatology  Department of Dermatology  [facility]      CC:   Chief Complaint   Patient presents with     Derm Problem     FBSE          History of Present Illness:  Mr. Yahir Phillips is a 39 year old male who presents as a new patient.    Pt presents today for concerns about spots on trunk and extremities and sensitivity on  penis      Labs:      Physical exam:  Vitals: There were no vitals taken for this visit.  GEN: well developed, well-nourished, in no acute distress, in a pleasant mood.     SKIN: Corey phototype 1  - Full skin, which includes the head/face, both arms, chest, back, abdomen,both legs, genitalia and/or groin buttocks, digits and/or nails, was examined.  - Flat brown macules and patches in a sun exposed areas on face and extremities  - scattered brown papules on trunk and extremities   - No other lesions of concern on areas examined.     Past Medical History:   Past Medical History:   Diagnosis Date     Pure hypercholesterolemia      Past Surgical History:   Procedure Laterality Date     COLONOSCOPY  3/3/21     FRACTURE TX, WRIST RT/LT       HC TOOTH EXTRACTION W/FORCEP         Social History:   reports that he has never smoked. He has never used smokeless tobacco. He reports current alcohol use. He reports that he does not use drugs.    Family History:  Family History   Problem Relation Age of Onset     Hypertension Father      Coronary Artery Disease Father 58        Never smoker.     Prostate Cancer Father      Colon Polyps Mother      Hyperlipidemia Mother      CABG Paternal Grandmother 64     Coronary Artery Disease Paternal Grandmother      Myocardial Infarction Paternal Uncle 56       Medications:  Current Outpatient Medications   Medication Sig Dispense Refill     busPIRone (BUSPAR) 5 MG tablet 5 mg PO every day x 3 days; 5 mg PO BID x 3 days; 10 mg PO q am, 5 mg PO at bedtime x 3 days; 10 mg PO  tablet 1     ketoconazole (NIZORAL) 2 % external cream Apply topically daily 60 g 1     Allergies   Allergen Reactions     Atorvastatin Muscle Pain (Myalgia)     Atorvastatin Muscle Pain (Myalgia)               Again, thank you for allowing me to participate in the care of your patient.        Sincerely,        Pal Dave MD    Electronically signed

## 2025-05-19 NOTE — PROGRESS NOTES
[facility] Dermatology Note    Encounter Date: May 19, 2025    Dermatology Problem List:    ______________________________________    Impression/Plan:  Roberto was seen today for derm problem.    Diagnoses and all orders for this visit:    Irritant contact dermatitis due to other agents  - irritation on glans of penis, only happens after sex or masturbation  - discussed likely friction related  - he states this has never happened before and wants to know what changed  - may be excess moisture (uncircumcised) leading to increased irritation when friction applied.   - rec drying glans regularly and after showering, make sure adequate lubrication used during sex and masturbation    Intertrigo  - rec moisture and friction control  - discussed prescription Rx, declines for now, will reach out to me if feels it is necessary    Inflamed seborrheic keratosis  -     DESTRUCT BENIGN LESION, UP TO 14    Cryotherapy procedure note: After verbal consent and discussion of risks and benefits including but no limited to dyspigmentation/scar, blister, and pain, 5 ISKs on face and back was(were) treated with 1-2mm freeze border for 2 cycles with liquid nitrogen. Post cryotherapy instructions were provided.       Follow-up PRN.       Staff Involved:  Staff Only    Pal Dave MD   of Dermatology  Department of Dermatology  [facility]      CC:   Chief Complaint   Patient presents with    Derm Problem     FBSE          History of Present Illness:  Mr. Yahir Phillips is a 39 year old male who presents as a new patient.    Pt presents today for concerns about spots on trunk and extremities and sensitivity on penis      Labs:      Physical exam:  Vitals: There were no vitals taken for this visit.  GEN: well developed, well-nourished, in no acute distress, in a pleasant mood.     SKIN: Corey phototype 1  - Full skin, which includes the head/face, both arms, chest,  back, abdomen,both legs, genitalia and/or groin buttocks, digits and/or nails, was examined.  - Flat brown macules and patches in a sun exposed areas on face and extremities  - scattered brown papules on trunk and extremities   - No other lesions of concern on areas examined.     Past Medical History:   Past Medical History:   Diagnosis Date    Pure hypercholesterolemia      Past Surgical History:   Procedure Laterality Date    COLONOSCOPY  3/3/21    FRACTURE TX, WRIST RT/LT      HC TOOTH EXTRACTION W/FORCEP         Social History:   reports that he has never smoked. He has never used smokeless tobacco. He reports current alcohol use. He reports that he does not use drugs.    Family History:  Family History   Problem Relation Age of Onset    Hypertension Father     Coronary Artery Disease Father 58        Never smoker.    Prostate Cancer Father     Colon Polyps Mother     Hyperlipidemia Mother     CABG Paternal Grandmother 64    Coronary Artery Disease Paternal Grandmother     Myocardial Infarction Paternal Uncle 56       Medications:  Current Outpatient Medications   Medication Sig Dispense Refill    busPIRone (BUSPAR) 5 MG tablet 5 mg PO every day x 3 days; 5 mg PO BID x 3 days; 10 mg PO q am, 5 mg PO at bedtime x 3 days; 10 mg PO  tablet 1    ketoconazole (NIZORAL) 2 % external cream Apply topically daily 60 g 1     Allergies   Allergen Reactions    Atorvastatin Muscle Pain (Myalgia)    Atorvastatin Muscle Pain (Myalgia)

## 2025-06-28 ENCOUNTER — HEALTH MAINTENANCE LETTER (OUTPATIENT)
Age: 40
End: 2025-06-28

## 2025-07-17 ENCOUNTER — WALK IN (OUTPATIENT)
Dept: URGENT CARE | Age: 40
End: 2025-07-17

## 2025-07-17 ENCOUNTER — LAB SERVICES (OUTPATIENT)
Dept: LAB | Age: 40
End: 2025-07-17

## 2025-07-17 VITALS
TEMPERATURE: 98 F | RESPIRATION RATE: 18 BRPM | WEIGHT: 175 LBS | HEART RATE: 66 BPM | OXYGEN SATURATION: 98 % | SYSTOLIC BLOOD PRESSURE: 110 MMHG | DIASTOLIC BLOOD PRESSURE: 62 MMHG

## 2025-07-17 DIAGNOSIS — R21 PENILE RASH: ICD-10-CM

## 2025-07-17 DIAGNOSIS — R21 PENILE RASH: Primary | ICD-10-CM

## 2025-07-17 PROCEDURE — 86780 TREPONEMA PALLIDUM: CPT | Performed by: CLINICAL MEDICAL LABORATORY

## 2025-07-17 PROCEDURE — 87491 CHLMYD TRACH DNA AMP PROBE: CPT | Performed by: CLINICAL MEDICAL LABORATORY

## 2025-07-17 PROCEDURE — 87389 HIV-1 AG W/HIV-1&-2 AB AG IA: CPT | Performed by: CLINICAL MEDICAL LABORATORY

## 2025-07-17 PROCEDURE — 87661 TRICHOMONAS VAGINALIS AMPLIF: CPT | Performed by: CLINICAL MEDICAL LABORATORY

## 2025-07-17 PROCEDURE — 87591 N.GONORRHOEAE DNA AMP PROB: CPT | Performed by: CLINICAL MEDICAL LABORATORY

## 2025-07-17 PROCEDURE — 99203 OFFICE O/P NEW LOW 30 MIN: CPT | Performed by: PHYSICIAN ASSISTANT

## 2025-07-17 PROCEDURE — 36415 COLL VENOUS BLD VENIPUNCTURE: CPT | Performed by: INTERNAL MEDICINE

## 2025-07-17 PROCEDURE — 86803 HEPATITIS C AB TEST: CPT | Performed by: CLINICAL MEDICAL LABORATORY

## 2025-07-17 RX ORDER — TADALAFIL 2.5 MG/1
TABLET ORAL
COMMUNITY

## 2025-07-17 RX ORDER — CLOBETASOL PROPIONATE 0.5 MG/G
1 OINTMENT TOPICAL DAILY
Qty: 30 G | Refills: 0 | Status: SHIPPED | OUTPATIENT
Start: 2025-07-17

## 2025-07-17 RX ORDER — CLOTRIMAZOLE AND BETAMETHASONE DIPROPIONATE 10; .64 MG/G; MG/G
1 CREAM TOPICAL 2 TIMES DAILY
Qty: 30 G | Refills: 0 | Status: SHIPPED | OUTPATIENT
Start: 2025-07-17 | End: 2025-07-17 | Stop reason: ALTCHOICE

## 2025-07-17 RX ORDER — CLOTRIMAZOLE 1 %
CREAM (GRAM) TOPICAL 2 TIMES DAILY
Qty: 30 G | Refills: 0 | Status: SHIPPED | OUTPATIENT
Start: 2025-07-17

## 2025-07-18 LAB
C TRACH RRNA UR QL NAA+PROBE: NEGATIVE
HCV AB SER QL: NEGATIVE
HIV 1+2 AB+HIV1 P24 AG SERPL QL IA: NONREACTIVE
N GONORRHOEA RRNA UR QL NAA+PROBE: NEGATIVE
SERVICE CMNT-IMP: NORMAL
T VAGINALIS RRNA UR QL NAA+PROBE: NEGATIVE
TREPONEMA PALLIDUM IGG+IGM AB [PRESENCE] IN SERUM OR PLASMA BY IMMUNOASSAY: NONREACTIVE

## (undated) RX ORDER — NITROGLYCERIN 0.4 MG/1
TABLET SUBLINGUAL
Status: DISPENSED
Start: 2021-05-20